# Patient Record
Sex: FEMALE | Race: ASIAN | NOT HISPANIC OR LATINO | URBAN - METROPOLITAN AREA
[De-identification: names, ages, dates, MRNs, and addresses within clinical notes are randomized per-mention and may not be internally consistent; named-entity substitution may affect disease eponyms.]

---

## 2023-08-11 ENCOUNTER — INPATIENT (INPATIENT)
Facility: HOSPITAL | Age: 68
LOS: 4 days | Discharge: HOME | End: 2023-08-16
Attending: STUDENT IN AN ORGANIZED HEALTH CARE EDUCATION/TRAINING PROGRAM | Admitting: STUDENT IN AN ORGANIZED HEALTH CARE EDUCATION/TRAINING PROGRAM
Payer: SELF-PAY

## 2023-08-11 VITALS
TEMPERATURE: 98 F | DIASTOLIC BLOOD PRESSURE: 65 MMHG | HEART RATE: 109 BPM | RESPIRATION RATE: 15 BRPM | OXYGEN SATURATION: 100 % | SYSTOLIC BLOOD PRESSURE: 115 MMHG
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
## 2023-08-11 LAB
ALBUMIN SERPL ELPH-MCNC: 3.5 G/DL — SIGNIFICANT CHANGE UP (ref 3.3–5)
ALP SERPL-CCNC: 61 U/L — SIGNIFICANT CHANGE UP (ref 40–120)
ALT FLD-CCNC: 23 U/L — SIGNIFICANT CHANGE UP (ref 4–33)
ANION GAP SERPL CALC-SCNC: 16 MMOL/L — HIGH (ref 7–14)
APPEARANCE UR: CLEAR — SIGNIFICANT CHANGE UP
APTT BLD: 31.2 SEC — SIGNIFICANT CHANGE UP (ref 24.5–35.6)
AST SERPL-CCNC: 23 U/L — SIGNIFICANT CHANGE UP (ref 4–32)
B-OH-BUTYR SERPL-SCNC: 0.4 MMOL/L — SIGNIFICANT CHANGE UP (ref 0–0.4)
BASOPHILS # BLD AUTO: 0.04 K/UL — SIGNIFICANT CHANGE UP (ref 0–0.2)
BASOPHILS NFR BLD AUTO: 0.5 % — SIGNIFICANT CHANGE UP (ref 0–2)
BILIRUB SERPL-MCNC: 0.3 MG/DL — SIGNIFICANT CHANGE UP (ref 0.2–1.2)
BILIRUB UR-MCNC: NEGATIVE — SIGNIFICANT CHANGE UP
BLD GP AB SCN SERPL QL: NEGATIVE — SIGNIFICANT CHANGE UP
BUN SERPL-MCNC: 37 MG/DL — HIGH (ref 7–23)
CALCIUM SERPL-MCNC: 9.8 MG/DL — SIGNIFICANT CHANGE UP (ref 8.4–10.5)
CHLORIDE SERPL-SCNC: 97 MMOL/L — LOW (ref 98–107)
CO2 SERPL-SCNC: 22 MMOL/L — SIGNIFICANT CHANGE UP (ref 22–31)
COLOR SPEC: YELLOW — SIGNIFICANT CHANGE UP
CREAT SERPL-MCNC: 1.26 MG/DL — SIGNIFICANT CHANGE UP (ref 0.5–1.3)
DIFF PNL FLD: NEGATIVE — SIGNIFICANT CHANGE UP
EGFR: 47 ML/MIN/1.73M2 — LOW
EOSINOPHIL # BLD AUTO: 0.21 K/UL — SIGNIFICANT CHANGE UP (ref 0–0.5)
EOSINOPHIL NFR BLD AUTO: 2.8 % — SIGNIFICANT CHANGE UP (ref 0–6)
GAS PNL BLDV: SIGNIFICANT CHANGE UP
GLUCOSE SERPL-MCNC: 183 MG/DL — HIGH (ref 70–99)
GLUCOSE UR QL: NEGATIVE MG/DL — SIGNIFICANT CHANGE UP
HCT VFR BLD CALC: 31.4 % — LOW (ref 34.5–45)
HGB BLD-MCNC: 10 G/DL — LOW (ref 11.5–15.5)
IANC: 4.33 K/UL — SIGNIFICANT CHANGE UP (ref 1.8–7.4)
IMM GRANULOCYTES NFR BLD AUTO: 0.3 % — SIGNIFICANT CHANGE UP (ref 0–0.9)
INR BLD: 1.02 RATIO — SIGNIFICANT CHANGE UP (ref 0.85–1.18)
KETONES UR-MCNC: NEGATIVE MG/DL — SIGNIFICANT CHANGE UP
LACTATE SERPL-SCNC: 4 MMOL/L — CRITICAL HIGH (ref 0.5–2)
LEUKOCYTE ESTERASE UR-ACNC: ABNORMAL
LIDOCAIN IGE QN: 88 U/L — HIGH (ref 7–60)
LYMPHOCYTES # BLD AUTO: 2.34 K/UL — SIGNIFICANT CHANGE UP (ref 1–3.3)
LYMPHOCYTES # BLD AUTO: 31.4 % — SIGNIFICANT CHANGE UP (ref 13–44)
MCHC RBC-ENTMCNC: 24 PG — LOW (ref 27–34)
MCHC RBC-ENTMCNC: 31.8 GM/DL — LOW (ref 32–36)
MCV RBC AUTO: 75.5 FL — LOW (ref 80–100)
MONOCYTES # BLD AUTO: 0.52 K/UL — SIGNIFICANT CHANGE UP (ref 0–0.9)
MONOCYTES NFR BLD AUTO: 7 % — SIGNIFICANT CHANGE UP (ref 2–14)
NEUTROPHILS # BLD AUTO: 4.33 K/UL — SIGNIFICANT CHANGE UP (ref 1.8–7.4)
NEUTROPHILS NFR BLD AUTO: 58 % — SIGNIFICANT CHANGE UP (ref 43–77)
NITRITE UR-MCNC: NEGATIVE — SIGNIFICANT CHANGE UP
NRBC # BLD: 0 /100 WBCS — SIGNIFICANT CHANGE UP (ref 0–0)
NRBC # FLD: 0 K/UL — SIGNIFICANT CHANGE UP (ref 0–0)
NT-PROBNP SERPL-SCNC: 127 PG/ML — SIGNIFICANT CHANGE UP
PH UR: 5.5 — SIGNIFICANT CHANGE UP (ref 5–8)
PLATELET # BLD AUTO: 204 K/UL — SIGNIFICANT CHANGE UP (ref 150–400)
POTASSIUM SERPL-MCNC: 5 MMOL/L — SIGNIFICANT CHANGE UP (ref 3.5–5.3)
POTASSIUM SERPL-SCNC: 5 MMOL/L — SIGNIFICANT CHANGE UP (ref 3.5–5.3)
PROT SERPL-MCNC: 6.6 G/DL — SIGNIFICANT CHANGE UP (ref 6–8.3)
PROT UR-MCNC: NEGATIVE MG/DL — SIGNIFICANT CHANGE UP
PROTHROM AB SERPL-ACNC: 11.5 SEC — SIGNIFICANT CHANGE UP (ref 9.5–13)
RBC # BLD: 4.16 M/UL — SIGNIFICANT CHANGE UP (ref 3.8–5.2)
RBC # FLD: 15 % — HIGH (ref 10.3–14.5)
RH IG SCN BLD-IMP: POSITIVE — SIGNIFICANT CHANGE UP
SODIUM SERPL-SCNC: 135 MMOL/L — SIGNIFICANT CHANGE UP (ref 135–145)
SP GR SPEC: 1.02 — SIGNIFICANT CHANGE UP (ref 1–1.03)
TROPONIN T, HIGH SENSITIVITY RESULT: 29 NG/L — SIGNIFICANT CHANGE UP
TROPONIN T, HIGH SENSITIVITY RESULT: 35 NG/L — SIGNIFICANT CHANGE UP
UROBILINOGEN FLD QL: 0.2 MG/DL — SIGNIFICANT CHANGE UP (ref 0.2–1)
WBC # BLD: 7.46 K/UL — SIGNIFICANT CHANGE UP (ref 3.8–10.5)
WBC # FLD AUTO: 7.46 K/UL — SIGNIFICANT CHANGE UP (ref 3.8–10.5)

## 2023-08-11 PROCEDURE — 73501 X-RAY EXAM HIP UNI 1 VIEW: CPT | Mod: 26,LT

## 2023-08-11 PROCEDURE — 99285 EMERGENCY DEPT VISIT HI MDM: CPT

## 2023-08-11 PROCEDURE — 99223 1ST HOSP IP/OBS HIGH 75: CPT

## 2023-08-11 PROCEDURE — 71045 X-RAY EXAM CHEST 1 VIEW: CPT | Mod: 26

## 2023-08-11 RX ORDER — HEPARIN SODIUM 5000 [USP'U]/ML
5000 INJECTION INTRAVENOUS; SUBCUTANEOUS EVERY 8 HOURS
Refills: 0 | Status: DISCONTINUED | OUTPATIENT
Start: 2023-08-11 | End: 2023-08-16

## 2023-08-11 RX ORDER — NIFEDIPINE 30 MG
1 TABLET, EXTENDED RELEASE 24 HR ORAL
Refills: 0 | DISCHARGE

## 2023-08-11 RX ORDER — ATORVASTATIN CALCIUM 80 MG/1
1 TABLET, FILM COATED ORAL
Refills: 0 | DISCHARGE

## 2023-08-11 RX ORDER — ACETAMINOPHEN 500 MG
650 TABLET ORAL EVERY 6 HOURS
Refills: 0 | Status: DISCONTINUED | OUTPATIENT
Start: 2023-08-11 | End: 2023-08-16

## 2023-08-11 RX ORDER — SODIUM CHLORIDE 9 MG/ML
1000 INJECTION INTRAMUSCULAR; INTRAVENOUS; SUBCUTANEOUS ONCE
Refills: 0 | Status: COMPLETED | OUTPATIENT
Start: 2023-08-11 | End: 2023-08-11

## 2023-08-11 RX ORDER — METFORMIN HYDROCHLORIDE 850 MG/1
1 TABLET ORAL
Refills: 0 | DISCHARGE

## 2023-08-11 RX ORDER — LANOLIN ALCOHOL/MO/W.PET/CERES
3 CREAM (GRAM) TOPICAL AT BEDTIME
Refills: 0 | Status: DISCONTINUED | OUTPATIENT
Start: 2023-08-11 | End: 2023-08-16

## 2023-08-11 RX ADMIN — SODIUM CHLORIDE 1000 MILLILITER(S): 9 INJECTION INTRAMUSCULAR; INTRAVENOUS; SUBCUTANEOUS at 14:15

## 2023-08-11 RX ADMIN — HEPARIN SODIUM 5000 UNIT(S): 5000 INJECTION INTRAVENOUS; SUBCUTANEOUS at 23:20

## 2023-08-11 NOTE — ED ADULT NURSE NOTE - NSFALLRISKINTERV_ED_ALL_ED

## 2023-08-11 NOTE — ED PROVIDER NOTE - NSICDXFAMILYHX_GEN_ALL_CORE_FT
FAMILY HISTORY:  Father  Still living? Unknown  Family history of hypertension, Age at diagnosis: Age Unknown    Grandparent  Still living? Unknown  Family history of stroke, Age at diagnosis: Age Unknown

## 2023-08-11 NOTE — ED PROVIDER NOTE - OBJECTIVE STATEMENT
Patient is a 68-year-old female with a past medical history of hypertension, diabetes on metformin who presents emergency department complaining of left-sided chest pain and left-sided pain x1 day.  Patient also states that she was diagnosed with heart failure in [facility].  Patient states that over the last few days she has increased falls due to pain.  Patient denies any fevers, chills, shortness of breath, abdominal pain, diarrhea, constipation.  Patient states the chest pain is nonradiating and intermittent in nature.

## 2023-08-11 NOTE — ED ADULT TRIAGE NOTE - CHIEF COMPLAINT QUOTE
Presents to ED c/o L sided chest pain and L flank pain x 1 day. Denies urinary symptoms. History of DM, HTN, HF.

## 2023-08-11 NOTE — ED PROVIDER NOTE - ATTENDING CONTRIBUTION TO CARE
I performed a history and physical exam of the patient and discussed their management with the resident and /or advanced care provider. I reviewed the resident and /or ACP's note and agree with the documented findings and plan of care. My medical decision making and observations are found above.  Lungs with scattered rales,  abd soft

## 2023-08-11 NOTE — ED PROVIDER NOTE - CLINICAL SUMMARY MEDICAL DECISION MAKING FREE TEXT BOX
Patient presents emergency department complaining of chest pain.  Patient is hemodynamic stable afebrile presentation.  Patient physical exam unremarkable at this time.  Patient's EKG shows no acute actionable items.  We will obtain ACS work-up and seizure work-up.  We will also obtain x-ray of the chest and pelvis to evaluate for any acute pathologies.  Pending labs and imaging for further disposition. Patient presents emergency department complaining of chest pain.  Patient is hemodynamic stable afebrile presentation.  Patient physical exam unremarkable at this time.  Patient's EKG shows no acute actionable items.  We will obtain ACS work-up and seizure work-up.  We will also obtain x-ray of the chest and pelvis to evaluate for any acute pathologies.  Pending labs and imaging for further disposition.  Alonso:68-year-old female who presents to the emergency department not able to walk across the room without shortness of breath and with 5 falls in the last few weeks.  [place].  Patient with pain in her left hip and back.  Will evaluate for ACS and CHF.  Will look for infectious sources including urine and lung.  And whether she needs to stay in the hospital will be really dependent on whether she can walk enough to take care of herself.

## 2023-08-12 DIAGNOSIS — R07.9 CHEST PAIN, UNSPECIFIED: ICD-10-CM

## 2023-08-12 DIAGNOSIS — R05.9 COUGH, UNSPECIFIED: ICD-10-CM

## 2023-08-12 DIAGNOSIS — Z98.890 OTHER SPECIFIED POSTPROCEDURAL STATES: Chronic | ICD-10-CM

## 2023-08-12 DIAGNOSIS — I10 ESSENTIAL (PRIMARY) HYPERTENSION: ICD-10-CM

## 2023-08-12 DIAGNOSIS — E11.9 TYPE 2 DIABETES MELLITUS WITHOUT COMPLICATIONS: ICD-10-CM

## 2023-08-12 DIAGNOSIS — E78.5 HYPERLIPIDEMIA, UNSPECIFIED: ICD-10-CM

## 2023-08-12 DIAGNOSIS — N17.9 ACUTE KIDNEY FAILURE, UNSPECIFIED: ICD-10-CM

## 2023-08-12 DIAGNOSIS — E87.20 ACIDOSIS, UNSPECIFIED: ICD-10-CM

## 2023-08-12 DIAGNOSIS — J45.909 UNSPECIFIED ASTHMA, UNCOMPLICATED: ICD-10-CM

## 2023-08-12 DIAGNOSIS — R09.89 OTHER SPECIFIED SYMPTOMS AND SIGNS INVOLVING THE CIRCULATORY AND RESPIRATORY SYSTEMS: ICD-10-CM

## 2023-08-12 DIAGNOSIS — D50.9 IRON DEFICIENCY ANEMIA, UNSPECIFIED: ICD-10-CM

## 2023-08-12 DIAGNOSIS — Z71.89 OTHER SPECIFIED COUNSELING: ICD-10-CM

## 2023-08-12 DIAGNOSIS — Z29.9 ENCOUNTER FOR PROPHYLACTIC MEASURES, UNSPECIFIED: ICD-10-CM

## 2023-08-12 LAB
A1C WITH ESTIMATED AVERAGE GLUCOSE RESULT: 8.4 % — HIGH (ref 4–5.6)
ALBUMIN SERPL ELPH-MCNC: 3.5 G/DL — SIGNIFICANT CHANGE UP (ref 3.3–5)
ALP SERPL-CCNC: 62 U/L — SIGNIFICANT CHANGE UP (ref 40–120)
ALT FLD-CCNC: 22 U/L — SIGNIFICANT CHANGE UP (ref 4–33)
ANION GAP SERPL CALC-SCNC: 12 MMOL/L — SIGNIFICANT CHANGE UP (ref 7–14)
APTT BLD: 29.8 SEC — SIGNIFICANT CHANGE UP (ref 24.5–35.6)
AST SERPL-CCNC: 34 U/L — HIGH (ref 4–32)
BASOPHILS # BLD AUTO: 0.03 K/UL — SIGNIFICANT CHANGE UP (ref 0–0.2)
BASOPHILS NFR BLD AUTO: 0.5 % — SIGNIFICANT CHANGE UP (ref 0–2)
BILIRUB SERPL-MCNC: 0.4 MG/DL — SIGNIFICANT CHANGE UP (ref 0.2–1.2)
BUN SERPL-MCNC: 26 MG/DL — HIGH (ref 7–23)
CALCIUM SERPL-MCNC: 9.9 MG/DL — SIGNIFICANT CHANGE UP (ref 8.4–10.5)
CHLORIDE SERPL-SCNC: 103 MMOL/L — SIGNIFICANT CHANGE UP (ref 98–107)
CHOLEST SERPL-MCNC: 128 MG/DL — SIGNIFICANT CHANGE UP
CO2 SERPL-SCNC: 24 MMOL/L — SIGNIFICANT CHANGE UP (ref 22–31)
CREAT SERPL-MCNC: 1.12 MG/DL — SIGNIFICANT CHANGE UP (ref 0.5–1.3)
EGFR: 54 ML/MIN/1.73M2 — LOW
EOSINOPHIL # BLD AUTO: 0.24 K/UL — SIGNIFICANT CHANGE UP (ref 0–0.5)
EOSINOPHIL NFR BLD AUTO: 4.2 % — SIGNIFICANT CHANGE UP (ref 0–6)
ESTIMATED AVERAGE GLUCOSE: 194 — SIGNIFICANT CHANGE UP
FERRITIN SERPL-MCNC: 72 NG/ML — SIGNIFICANT CHANGE UP (ref 13–330)
GLUCOSE SERPL-MCNC: 118 MG/DL — HIGH (ref 70–99)
HCT VFR BLD CALC: 32.5 % — LOW (ref 34.5–45)
HDLC SERPL-MCNC: 60 MG/DL — SIGNIFICANT CHANGE UP
HGB BLD-MCNC: 10.2 G/DL — LOW (ref 11.5–15.5)
IANC: 2.74 K/UL — SIGNIFICANT CHANGE UP (ref 1.8–7.4)
IMM GRANULOCYTES NFR BLD AUTO: 0.4 % — SIGNIFICANT CHANGE UP (ref 0–0.9)
INR BLD: 1.05 RATIO — SIGNIFICANT CHANGE UP (ref 0.85–1.18)
IRON SATN MFR SERPL: 26 % — SIGNIFICANT CHANGE UP (ref 14–50)
IRON SATN MFR SERPL: 62 UG/DL — SIGNIFICANT CHANGE UP (ref 30–160)
LACTATE SERPL-SCNC: 1.1 MMOL/L — SIGNIFICANT CHANGE UP (ref 0.5–2)
LIPID PNL WITH DIRECT LDL SERPL: 51 MG/DL — SIGNIFICANT CHANGE UP
LYMPHOCYTES # BLD AUTO: 2.22 K/UL — SIGNIFICANT CHANGE UP (ref 1–3.3)
LYMPHOCYTES # BLD AUTO: 39 % — SIGNIFICANT CHANGE UP (ref 13–44)
MAGNESIUM SERPL-MCNC: 1.7 MG/DL — SIGNIFICANT CHANGE UP (ref 1.6–2.6)
MCHC RBC-ENTMCNC: 23.8 PG — LOW (ref 27–34)
MCHC RBC-ENTMCNC: 31.4 GM/DL — LOW (ref 32–36)
MCV RBC AUTO: 75.9 FL — LOW (ref 80–100)
MONOCYTES # BLD AUTO: 0.44 K/UL — SIGNIFICANT CHANGE UP (ref 0–0.9)
MONOCYTES NFR BLD AUTO: 7.7 % — SIGNIFICANT CHANGE UP (ref 2–14)
NEUTROPHILS # BLD AUTO: 2.74 K/UL — SIGNIFICANT CHANGE UP (ref 1.8–7.4)
NEUTROPHILS NFR BLD AUTO: 48.2 % — SIGNIFICANT CHANGE UP (ref 43–77)
NON HDL CHOLESTEROL: 68 MG/DL — SIGNIFICANT CHANGE UP
NRBC # BLD: 0 /100 WBCS — SIGNIFICANT CHANGE UP (ref 0–0)
NRBC # FLD: 0 K/UL — SIGNIFICANT CHANGE UP (ref 0–0)
NT-PROBNP SERPL-SCNC: 133 PG/ML — SIGNIFICANT CHANGE UP
PHOSPHATE SERPL-MCNC: 3.8 MG/DL — SIGNIFICANT CHANGE UP (ref 2.5–4.5)
PLATELET # BLD AUTO: 220 K/UL — SIGNIFICANT CHANGE UP (ref 150–400)
POTASSIUM SERPL-MCNC: 4.4 MMOL/L — SIGNIFICANT CHANGE UP (ref 3.5–5.3)
POTASSIUM SERPL-SCNC: 4.4 MMOL/L — SIGNIFICANT CHANGE UP (ref 3.5–5.3)
PROCALCITONIN SERPL-MCNC: 0.1 NG/ML — SIGNIFICANT CHANGE UP (ref 0.02–0.1)
PROT SERPL-MCNC: 6.9 G/DL — SIGNIFICANT CHANGE UP (ref 6–8.3)
PROTHROM AB SERPL-ACNC: 11.7 SEC — SIGNIFICANT CHANGE UP (ref 9.5–13)
RBC # BLD: 4.28 M/UL — SIGNIFICANT CHANGE UP (ref 3.8–5.2)
RBC # FLD: 15.1 % — HIGH (ref 10.3–14.5)
SODIUM SERPL-SCNC: 139 MMOL/L — SIGNIFICANT CHANGE UP (ref 135–145)
TIBC SERPL-MCNC: 242 UG/DL — SIGNIFICANT CHANGE UP (ref 220–430)
TRANSFERRIN SERPL-MCNC: 209 MG/DL — SIGNIFICANT CHANGE UP (ref 200–360)
TRIGL SERPL-MCNC: 84 MG/DL — SIGNIFICANT CHANGE UP
UIBC SERPL-MCNC: 180 UG/DL — SIGNIFICANT CHANGE UP (ref 110–370)
WBC # BLD: 5.69 K/UL — SIGNIFICANT CHANGE UP (ref 3.8–10.5)
WBC # FLD AUTO: 5.69 K/UL — SIGNIFICANT CHANGE UP (ref 3.8–10.5)

## 2023-08-12 PROCEDURE — 93971 EXTREMITY STUDY: CPT | Mod: 26,LT

## 2023-08-12 PROCEDURE — 76770 US EXAM ABDO BACK WALL COMP: CPT | Mod: 26

## 2023-08-12 PROCEDURE — 99233 SBSQ HOSP IP/OBS HIGH 50: CPT

## 2023-08-12 RX ORDER — DEXTROSE 50 % IN WATER 50 %
15 SYRINGE (ML) INTRAVENOUS ONCE
Refills: 0 | Status: DISCONTINUED | OUTPATIENT
Start: 2023-08-12 | End: 2023-08-16

## 2023-08-12 RX ORDER — DEXTROSE 50 % IN WATER 50 %
25 SYRINGE (ML) INTRAVENOUS ONCE
Refills: 0 | Status: DISCONTINUED | OUTPATIENT
Start: 2023-08-12 | End: 2023-08-16

## 2023-08-12 RX ORDER — INSULIN LISPRO 100/ML
VIAL (ML) SUBCUTANEOUS
Refills: 0 | Status: DISCONTINUED | OUTPATIENT
Start: 2023-08-12 | End: 2023-08-16

## 2023-08-12 RX ORDER — INSULIN LISPRO 100/ML
VIAL (ML) SUBCUTANEOUS AT BEDTIME
Refills: 0 | Status: DISCONTINUED | OUTPATIENT
Start: 2023-08-12 | End: 2023-08-16

## 2023-08-12 RX ORDER — SODIUM CHLORIDE 9 MG/ML
1000 INJECTION, SOLUTION INTRAVENOUS
Refills: 0 | Status: DISCONTINUED | OUTPATIENT
Start: 2023-08-12 | End: 2023-08-16

## 2023-08-12 RX ORDER — DEXTROSE 50 % IN WATER 50 %
12.5 SYRINGE (ML) INTRAVENOUS ONCE
Refills: 0 | Status: DISCONTINUED | OUTPATIENT
Start: 2023-08-12 | End: 2023-08-16

## 2023-08-12 RX ORDER — ATORVASTATIN CALCIUM 80 MG/1
20 TABLET, FILM COATED ORAL AT BEDTIME
Refills: 0 | Status: DISCONTINUED | OUTPATIENT
Start: 2023-08-12 | End: 2023-08-16

## 2023-08-12 RX ORDER — BUDESONIDE AND FORMOTEROL FUMARATE DIHYDRATE 160; 4.5 UG/1; UG/1
2 AEROSOL RESPIRATORY (INHALATION)
Refills: 0 | Status: DISCONTINUED | OUTPATIENT
Start: 2023-08-12 | End: 2023-08-16

## 2023-08-12 RX ORDER — AZITHROMYCIN 500 MG/1
500 TABLET, FILM COATED ORAL DAILY
Refills: 0 | Status: DISCONTINUED | OUTPATIENT
Start: 2023-08-12 | End: 2023-08-13

## 2023-08-12 RX ORDER — ALBUTEROL 90 UG/1
2 AEROSOL, METERED ORAL EVERY 6 HOURS
Refills: 0 | Status: DISCONTINUED | OUTPATIENT
Start: 2023-08-12 | End: 2023-08-16

## 2023-08-12 RX ORDER — GLUCAGON INJECTION, SOLUTION 0.5 MG/.1ML
1 INJECTION, SOLUTION SUBCUTANEOUS ONCE
Refills: 0 | Status: DISCONTINUED | OUTPATIENT
Start: 2023-08-12 | End: 2023-08-16

## 2023-08-12 RX ORDER — LOSARTAN POTASSIUM 100 MG/1
50 TABLET, FILM COATED ORAL DAILY
Refills: 0 | Status: DISCONTINUED | OUTPATIENT
Start: 2023-08-12 | End: 2023-08-15

## 2023-08-12 RX ORDER — CEFTRIAXONE 500 MG/1
1000 INJECTION, POWDER, FOR SOLUTION INTRAMUSCULAR; INTRAVENOUS EVERY 24 HOURS
Refills: 0 | Status: DISCONTINUED | OUTPATIENT
Start: 2023-08-12 | End: 2023-08-13

## 2023-08-12 RX ORDER — AMLODIPINE BESYLATE 2.5 MG/1
10 TABLET ORAL DAILY
Refills: 0 | Status: DISCONTINUED | OUTPATIENT
Start: 2023-08-12 | End: 2023-08-12

## 2023-08-12 RX ORDER — CEFTRIAXONE 500 MG/1
1000 INJECTION, POWDER, FOR SOLUTION INTRAMUSCULAR; INTRAVENOUS EVERY 24 HOURS
Refills: 0 | Status: DISCONTINUED | OUTPATIENT
Start: 2023-08-12 | End: 2023-08-12

## 2023-08-12 RX ORDER — NIFEDIPINE 30 MG
30 TABLET, EXTENDED RELEASE 24 HR ORAL DAILY
Refills: 0 | Status: DISCONTINUED | OUTPATIENT
Start: 2023-08-12 | End: 2023-08-16

## 2023-08-12 RX ADMIN — HEPARIN SODIUM 5000 UNIT(S): 5000 INJECTION INTRAVENOUS; SUBCUTANEOUS at 14:45

## 2023-08-12 RX ADMIN — HEPARIN SODIUM 5000 UNIT(S): 5000 INJECTION INTRAVENOUS; SUBCUTANEOUS at 06:01

## 2023-08-12 RX ADMIN — LOSARTAN POTASSIUM 50 MILLIGRAM(S): 100 TABLET, FILM COATED ORAL at 06:17

## 2023-08-12 RX ADMIN — Medication 30 MILLIGRAM(S): at 06:59

## 2023-08-12 RX ADMIN — HEPARIN SODIUM 5000 UNIT(S): 5000 INJECTION INTRAVENOUS; SUBCUTANEOUS at 22:22

## 2023-08-12 RX ADMIN — AZITHROMYCIN 500 MILLIGRAM(S): 500 TABLET, FILM COATED ORAL at 11:56

## 2023-08-12 RX ADMIN — CEFTRIAXONE 100 MILLIGRAM(S): 500 INJECTION, POWDER, FOR SOLUTION INTRAMUSCULAR; INTRAVENOUS at 07:36

## 2023-08-12 RX ADMIN — ATORVASTATIN CALCIUM 20 MILLIGRAM(S): 80 TABLET, FILM COATED ORAL at 22:22

## 2023-08-12 RX ADMIN — Medication 3 MILLIGRAM(S): at 01:02

## 2023-08-12 NOTE — H&P ADULT - PROBLEM SELECTOR PLAN 4
-reportedly with productive cough over last week, was on -reportedly with productive cough over last week, was on ceftin at home   -will need to get accurate accounts of how many days of antibiotics she has taken   -will do ceftriaxone and azithromycin for now, will recommend completion of 7 days.   -CXR clear  -continue to monitor -reportedly with productive cough over last week, was on ceftin at home   -will need to get accurate accounts of how many days of antibiotics she has taken   -will do ceftriaxone and azithromycin for now, will recommend completion of 7 days.   -CXR clear, will order CT chest since ordered CT abdomen and pelvis, to r/o PNA  -continue to monitor

## 2023-08-12 NOTE — H&P ADULT - HISTORY OF PRESENT ILLNESS
Mr. Christianson is a 68 year old F with history of HTN, DM2 (on oral glycemic, previously on insulin), CVA (left sided 3 years ago, with some residual left sided weakness), HF? (recently diagnosed in [facility]), asthma who presents with left sided waist pain (described as a stretching/burning sensation) that radiates to the back that started 2 weeks. She reports the pain is worsened with exertion and worsens when bending or with movement. She reports over the last month she has had worsening SOB on exertion, that improves after 2 minutes at rest, that has become more frequent and occurs anytime she walks/exerts herself and is associated with syncope. She also reports productive cough- yellow sputum over that same time frame. She reports associated LE edema over the last month that is associated with PND. She reports weight gain 60-80 pounds in 2 years, more of which occurred over the last year. She reports two weeks ago she was seen in [facility] at that time, she was treated with a course of antibiotics (Augmentin at first followed by ceftin, given she had some oral ulcers with augmentin). She denies numbness/tingling in her arms that is new, jaw/neck pain, sweats, nausea, vomiting, or palpitations. She reports the pain has limited her ability to ambulate significantly. She reports chronic epigastric/abdominal pain, constipation, and chronic reflux (where she feels the acid in the back of her throat when she sleeps). Labs were sig for the following: Hb 10.0/31.4, MCV 75.4, RDW 15, LA 4.0, Cl 97, AG 16, BUN/Cr 37/1.26, , lipase 88, trop 35->29. EKG was interpreted by me: sinus rhythm,  bpm, p wave inversion in V1, Q waves in V1, V2, V3, QTc 430 ms. She tolerated ceftin at home. Urinalysis was significant for the following: UA-LE, moderate and 34 WBC. Denies any dysuria, urinary frequency, she has urinary incontinence periodically.  Mr. Christianson is a 68 year old F with history of HTN, DM2 (on oral glycemic, previously on insulin), CVA (left sided 3 years ago, with some residual left sided weakness), HF? (recently diagnosed in [facility]), asthma who presents with left sided waist pain (described as a stretching/burning sensation) that radiates to the back that started 2 weeks. She reports the pain is worsened with exertion and worsens when bending or with movement. She reports over the last month she has had worsening SOB on exertion, that improves after 2 minutes at rest, that has become more frequent and occurs anytime she walks/exerts herself and is associated with syncope. She also reports productive cough- yellow sputum over that same time frame. She reports associated LE edema over the last month that is associated with PND. She reports weight gain 60-80 pounds in 2 years, more of which occurred over the last year. She reports two weeks ago she was seen in [facility] at that time, she was treated with a course of antibiotics (Augmentin at first followed by ceftin, given she had some oral ulcers with Augmentin She denies numbness/tingling in her arms that is new, jaw/neck pain, sweats, nausea, vomiting, or palpitations. She reports the pain has limited her ability to ambulate significantly. She reports chronic epigastric/abdominal pain, constipation, and chronic reflux (where she feels the acid in the back of her throat when she sleeps). Labs were sig for the following: Hb 10.0/31.4, MCV 75.4, RDW 15, LA 4.0, Cl 97, AG 16, BUN/Cr 37/1.26, , lipase 88, trop 35->29. EKG was interpreted by me: sinus rhythm,  bpm, p wave inversion in V1, Q waves in V1, V2, V3, QTc 430 ms. She tolerated ceftin at home. Urinalysis was significant for the following: UA-LE, moderate and 34 WBC. Denies any dysuria, urinary frequency, she has urinary incontinence periodically.

## 2023-08-12 NOTE — H&P ADULT - NSICDXFAMILYHX_GEN_ALL_CORE_FT
FAMILY HISTORY:  Father  Still living? Unknown  Family history of hypertension, Age at diagnosis: Age Unknown  FH: diabetes mellitus, Age at diagnosis: Age Unknown    Grandparent  Still living? Unknown  Family history of stroke, Age at diagnosis: Age Unknown

## 2023-08-12 NOTE — PATIENT PROFILE ADULT - FALL HARM RISK - HARM RISK INTERVENTIONS
Assistance with ambulation/Assistance OOB with selected safe patient handling equipment/Communicate Risk of Fall with Harm to all staff/Reinforce activity limits and safety measures with patient and family/Sit up slowly, dangle for a short time, stand at bedside before walking/Tailored Fall Risk Interventions/Visual Cue: Yellow wristband and red socks/Bed in lowest position, wheels locked, appropriate side rails in place/Call bell, personal items and telephone in reach/Instruct patient to call for assistance before getting out of bed or chair/Non-slip footwear when patient is out of bed/[place] to call system/Physically safe environment - no spills, clutter or unnecessary equipment/Purposeful Proactive Rounding/Room/bathroom lighting operational, light cord in reach

## 2023-08-12 NOTE — H&P ADULT - PROBLEM SELECTOR PLAN 2
-BUN/Cr 37/1.26, unknown baseline, suspect hypovolemia is part of it given patient reports she does not drink any water   -obtain baseline Cr when able   -Renal bladder US ordered to rule out nephrolithiasis vs hydronephrosis   -Urine, Na ordered   -strict intake and output every 4 hours  -continue to monitor

## 2023-08-12 NOTE — H&P ADULT - PROBLEM SELECTOR PLAN 5
-On metformin 500 mg BID currently, previously on insulin   -A1C in AM  -correctional insulin for now  -BG ACHS

## 2023-08-12 NOTE — PROGRESS NOTE ADULT - PROBLEM SELECTOR PLAN 7
- at home on amlodipine 10 mg daily, nifedipine 15 mg BID and losartan 50 mg daily -> will confirm with med-pharmacy, will do nifedipine 30 mg daily and losartan 50 mg daily for now  -will send email to pharmacy to [facility] to confirm medications   -continue to monitor

## 2023-08-12 NOTE — H&P ADULT - PROBLEM SELECTOR PLAN 1
-intermittent chest pain on exertion with associated shortness of breath AND syncope concerning for angina   -trop 35->29,   -EKG with Q waves in V1-V3 concerning   -TTE ordered   -telemetry  -cardiology consult to be called in the AM

## 2023-08-12 NOTE — H&P ADULT - PROBLEM SELECTOR PLAN 10
DVT prophylaxis: heparin 5000 every 8 hours  GI prophylaxis: none- but sounds like she needs workup for GERD as an outpatient, since she has bad reflux for many years, needs endoscopy and colonoscopy (since never had colonoscopy).   Full code  Diet: diabetes diet

## 2023-08-12 NOTE — H&P ADULT - PROBLEM SELECTOR PLAN 7
- at home on amlodipine 10 mg daily, nifedipine 15 mg BID and losartan 50 mg daily   -will send email to pharmacy to [facility] to confirm medications   -continue to monitor - at home on amlodipine 10 mg daily, nifedipine 15 mg BID and losartan 50 mg daily -> will confirm with med-pharmacy, will do nifedipine 30 mg daily and losartan 50 mg daily for now  -will send email to pharmacy to [facility] to confirm medications   -continue to monitor

## 2023-08-12 NOTE — H&P ADULT - NSHPPHYSICALEXAM_GEN_ALL_CORE
Vital Signs Last 24 Hrs  T(C): 36.9 (11 Aug 2023 22:28), Max: 36.9 (11 Aug 2023 22:28)  T(F): 98.5 (11 Aug 2023 22:28), Max: 98.5 (11 Aug 2023 22:28)  HR: 92 (11 Aug 2023 22:28) (85 - 112)  BP: 141/83 (11 Aug 2023 22:28) (115/65 - 143/85)  BP(mean): --  RR: 18 (11 Aug 2023 22:28) (15 - 18)  SpO2: 100% (11 Aug 2023 22:28) (100% - 100%)    Parameters below as of 11 Aug 2023 22:28  Patient On (Oxygen Delivery Method): room air        CONSTITUTIONAL: Well-groomed, in no apparent distress  EYES: No conjunctival or scleral injection, non-icteric; PERRLA and symmetric  ENMT: No external nasal lesions; nasal mucosa not inflamed; normal dentition  NECK: Trachea midline without palpable neck mass; thyroid not enlarged and non-tender  RESPIRATORY: Breathing comfortably; no dullness to percussion; lungs CTA without wheeze/rhonchi/rales  CARDIOVASCULAR: +S1S2, RRR, no M/G/R; pedal pulses full and symmetric; no 1+ lower extremity edema  GASTROINTESTINAL: No palpable masses or tenderness, +BS throughout, no rebound/guarding; no hepatosplenomegaly; no hernia palpated  MUSCULOSKELETAL:  no digital clubbing or cyanosis; strength (4/5 in left upper extremity, 5/5 in right upper extremity, 5/5 in the lower extremities bilaterally) and tone of extremities  SKIN: No rashes or ulcers noted; no subcutaneous nodules or induration palpable  NEUROLOGIC: CN II-XII intact; sensation intact in LEs b/l to light touch  PSYCHIATRIC: A+O x 2 (not oriented to time- didn't know year or month, new it was friday and new all the rest; mood and affect appropriate; appropriate insight and judgment Vital Signs Last 24 Hrs  T(C): 36.9 (11 Aug 2023 22:28), Max: 36.9 (11 Aug 2023 22:28)  T(F): 98.5 (11 Aug 2023 22:28), Max: 98.5 (11 Aug 2023 22:28)  HR: 92 (11 Aug 2023 22:28) (85 - 112)  BP: 141/83 (11 Aug 2023 22:28) (115/65 - 143/85)  BP(mean): --  RR: 18 (11 Aug 2023 22:28) (15 - 18)  SpO2: 100% (11 Aug 2023 22:28) (100% - 100%)    Parameters below as of 11 Aug 2023 22:28  Patient On (Oxygen Delivery Method): room air        CONSTITUTIONAL: Well-groomed, in no apparent distress  EYES: No conjunctival or scleral injection, non-icteric; PERRLA and symmetric  ENMT: No external nasal lesions; nasal mucosa not inflamed; normal dentition  NECK: Trachea midline without palpable neck mass; thyroid not enlarged and non-tender  RESPIRATORY: Breathing comfortably; no dullness to percussion; lungs CTA with wheeze, no rhonchi or rales  CARDIOVASCULAR: +S1S2, RRR, no M/G/R; pedal pulses full and symmetric; no 1+ lower extremity edema  GASTROINTESTINAL: No palpable masses or tenderness, +BS throughout, no rebound/guarding; no hepatosplenomegaly; no hernia palpated  MUSCULOSKELETAL:  no digital clubbing or cyanosis; strength (4/5 in left upper extremity, 5/5 in right upper extremity, 5/5 in the lower extremities bilaterally) and tone of extremities  SKIN: No rashes or ulcers noted; no subcutaneous nodules or induration palpable  NEUROLOGIC: CN II-XII intact; sensation intact in LEs b/l to light touch  PSYCHIATRIC: A+O x 2 (not oriented to time- didn't know year or month, new it was friday and new all the rest; mood and affect appropriate; appropriate insight and judgment

## 2023-08-12 NOTE — H&P ADULT - PROBLEM SELECTOR PLAN 6
-Lactic acid 4 with chest pain either cardiac vs abdominal source, with no troponin elevation, consider CT abdomen and pelvis to r/o ischemia or abdominal pathology considering LA elevation   -continue to trend with AM labs

## 2023-08-12 NOTE — H&P ADULT - NSHPREVIEWOFSYSTEMS_GEN_ALL_CORE
Review of Systems:   CONSTITUTIONAL: No fever, weight loss  EYES: No eye pain, visual disturbances, or discharge  ENMT:  No difficulty hearing, tinnitus, vertigo; No sinus or throat pain  RESPIRATORY: +SOB. +cough, +wheezing, chills or hemoptysis  CARDIOVASCULAR: +chest pain, no palpitations, +dizziness, or +leg swelling (left greater than right -for a several months-3 months)  GASTROINTESTINAL: No abdominal or epigastric pain. No nausea, vomiting, or hematemesis; No diarrhea or constipation. No melena or hematochezia.  GENITOURINARY: No dysuria, frequency, hematuria. +incontinence  NEUROLOGICAL: headaches, memory loss, loss of strength, numbness, or tremors  SKIN: No itching, burning, rashes, or lesions   ENDOCRINE: No heat or cold intolerance; No hair loss  MUSCULOSKELETAL: No joint pain or swelling; No muscle, +back pain  PSYCHIATRIC: No depression, anxiety, mood swings, or difficulty sleeping  HEME/LYMPH: No easy bruising, or bleeding gums

## 2023-08-12 NOTE — H&P ADULT - ASSESSMENT
Mr. Christianson is a 68 year old F with history of HTN, DM2 (on oral glycemic, previously on insulin), CVA (left sided 3 years ago, with some residual left sided weakness), HF? (recently diagnosed in [facility]), and asthma who presents with left sided waist pain that radiates to the back with exertion for the last 2 weeks.

## 2023-08-12 NOTE — H&P ADULT - NSICDXPASTMEDICALHX_GEN_ALL_CORE_FT
PAST MEDICAL HISTORY:  Asthma     CVA (cerebrovascular accident)     Diabetes     HTN (hypertension)

## 2023-08-12 NOTE — H&P ADULT - NSHPLABSRESULTS_GEN_ALL_CORE
10.0   7.46  )-----------( 204      ( 11 Aug 2023 11:59 )             31.4     135  |  97<L>  |  37<H>  ----------------------------<  183<H>       5.0   |  22  |  1.26    Ca    9.8      11 Aug 2023 11:59    TPro  6.6  /  Alb  3.5  /  TBili  0.3  /  DBili  x   /  AST  23  /  ALT  23  /  AlkPhos  61      PT/INR: 11.5/1.02 (23 @ 11:59)  PTT: 31.2 (23 @ 11:59)      11:59 - VBG - pH: 7.34  | pCO2: 44    | pO2: 37    | Lactate: 4.4            hs Troponin, T - 29 ng/L (23 @ 14:25)  hs Troponin, T - 35 ng/L (23 @ 11:59)      Pro-BNP: 127 (23 @ 11:59)          Urinalysis Basic - ( 11 Aug 2023 11:47 )  Color: Yellow / Appearance: Clear / S.016 / pH: 5.5  Gluc: Negative mg/dL / Ketone: Negative mg/dL  / Bili: Negative / Urobili: 0.2 mg/dL   Blood: Negative / Protein: Negative mg/dL / Nitrite: Negative   Leuk Esterase: Moderate / RBC: 2 /HPF / WBC 34 /HPF   Sq Epi: x / Non Sq Epi: x / Bacteria: Negative /HPF

## 2023-08-13 DIAGNOSIS — M89.9 DISORDER OF BONE, UNSPECIFIED: ICD-10-CM

## 2023-08-13 LAB
A1C WITH ESTIMATED AVERAGE GLUCOSE RESULT: 8.5 % — HIGH (ref 4–5.6)
CULTURE RESULTS: SIGNIFICANT CHANGE UP
ESTIMATED AVERAGE GLUCOSE: 197 — SIGNIFICANT CHANGE UP
HCV AB S/CO SERPL IA: 0.23 S/CO — SIGNIFICANT CHANGE UP (ref 0–0.99)
HCV AB SERPL-IMP: SIGNIFICANT CHANGE UP
SPECIMEN SOURCE: SIGNIFICANT CHANGE UP

## 2023-08-13 PROCEDURE — 71250 CT THORAX DX C-: CPT | Mod: 26

## 2023-08-13 PROCEDURE — 99233 SBSQ HOSP IP/OBS HIGH 50: CPT

## 2023-08-13 PROCEDURE — 74176 CT ABD & PELVIS W/O CONTRAST: CPT | Mod: 26

## 2023-08-13 RX ORDER — PANTOPRAZOLE SODIUM 20 MG/1
40 TABLET, DELAYED RELEASE ORAL
Refills: 0 | Status: DISCONTINUED | OUTPATIENT
Start: 2023-08-13 | End: 2023-08-16

## 2023-08-13 RX ORDER — LIDOCAINE 4 G/100G
1 CREAM TOPICAL DAILY
Refills: 0 | Status: DISCONTINUED | OUTPATIENT
Start: 2023-08-13 | End: 2023-08-16

## 2023-08-13 RX ORDER — INSULIN GLARGINE 100 [IU]/ML
3 INJECTION, SOLUTION SUBCUTANEOUS AT BEDTIME
Refills: 0 | Status: DISCONTINUED | OUTPATIENT
Start: 2023-08-13 | End: 2023-08-16

## 2023-08-13 RX ADMIN — HEPARIN SODIUM 5000 UNIT(S): 5000 INJECTION INTRAVENOUS; SUBCUTANEOUS at 14:55

## 2023-08-13 RX ADMIN — HEPARIN SODIUM 5000 UNIT(S): 5000 INJECTION INTRAVENOUS; SUBCUTANEOUS at 22:23

## 2023-08-13 RX ADMIN — LOSARTAN POTASSIUM 50 MILLIGRAM(S): 100 TABLET, FILM COATED ORAL at 06:37

## 2023-08-13 RX ADMIN — INSULIN GLARGINE 3 UNIT(S): 100 INJECTION, SOLUTION SUBCUTANEOUS at 22:24

## 2023-08-13 RX ADMIN — Medication 1: at 17:47

## 2023-08-13 RX ADMIN — Medication 75 MILLIGRAM(S): at 17:47

## 2023-08-13 RX ADMIN — HEPARIN SODIUM 5000 UNIT(S): 5000 INJECTION INTRAVENOUS; SUBCUTANEOUS at 06:38

## 2023-08-13 RX ADMIN — Medication 30 MILLIGRAM(S): at 06:37

## 2023-08-13 RX ADMIN — Medication 3: at 09:47

## 2023-08-13 RX ADMIN — CEFTRIAXONE 100 MILLIGRAM(S): 500 INJECTION, POWDER, FOR SOLUTION INTRAMUSCULAR; INTRAVENOUS at 06:53

## 2023-08-13 RX ADMIN — AZITHROMYCIN 500 MILLIGRAM(S): 500 TABLET, FILM COATED ORAL at 13:19

## 2023-08-13 RX ADMIN — Medication 650 MILLIGRAM(S): at 06:46

## 2023-08-13 RX ADMIN — Medication 650 MILLIGRAM(S): at 07:16

## 2023-08-13 RX ADMIN — Medication 2: at 13:19

## 2023-08-13 RX ADMIN — ATORVASTATIN CALCIUM 20 MILLIGRAM(S): 80 TABLET, FILM COATED ORAL at 22:23

## 2023-08-13 RX ADMIN — Medication 25 MILLIGRAM(S): at 10:15

## 2023-08-13 RX ADMIN — BUDESONIDE AND FORMOTEROL FUMARATE DIHYDRATE 2 PUFF(S): 160; 4.5 AEROSOL RESPIRATORY (INHALATION) at 10:39

## 2023-08-13 NOTE — CONSULT NOTE ADULT - SUBJECTIVE AND OBJECTIVE BOX
Víctor Fuentes MD  Interventional Cardiology / Advance Heart Failure and Cardiac Transplant Specialist  [place] Office : [address] [place] [address] [place] [address]  Tel:   [place] Office : [address] [facility] [place] [address]  Tel: [phone]         HISTORY OF PRESENTING ILLNESS:  HPI:  Mr. Christianson is a 68 year old F with history of HTN, DM2 (on oral glycemic, previously on insulin), CVA (left sided 3 years ago, with some residual left sided weakness), HF? (recently diagnosed in [facility]), asthma who presents with left sided waist pain (described as a stretching/burning sensation) that radiates to the back that started 2 weeks. She reports the pain is worsened with exertion and worsens when bending or with movement. She reports over the last month she has had worsening SOB on exertion, that improves after 2 minutes at rest, that has become more frequent and occurs anytime she walks/exerts herself and is associated with syncope. She also reports productive cough- yellow sputum over that same time frame. She reports associated LE edema over the last month that is associated with PND. She reports weight gain 60-80 pounds in 2 years, more of which occurred over the last year. She reports two weeks ago she was seen in [facility] at that time, she was treated with a course of antibiotics (Augmentin at first followed by ceftin, given she had some oral ulcers with Augmentin She denies numbness/tingling in her arms that is new, jaw/neck pain, sweats, nausea, vomiting, or palpitations. She reports the pain has limited her ability to ambulate significantly. She reports chronic epigastric/abdominal pain, constipation, and chronic reflux (where she feels the acid in the back of her throat when she sleeps). Labs were sig for the following: Hb 10.0/31.4, MCV 75.4, RDW 15, LA 4.0, Cl 97, AG 16, BUN/Cr 37/1.26, , lipase 88, trop 35->29. EKG was interpreted by me: sinus rhythm,  bpm, p wave inversion in V1, Q waves in V1, V2, V3, QTc 430 ms. She tolerated ceftin at home. Urinalysis was significant for the following: UA-LE, moderate and 34 WBC. Denies any dysuria, urinary frequency, she has urinary incontinence periodically.    PAST MEDICAL & SURGICAL HISTORY:  HTN (hypertension)      Diabetes      Asthma      CVA (cerebrovascular accident)      Status post hip surgery          SOCIAL HISTORY: Substance Use (street drugs): ( x ) never used  (  ) other:    FAMILY HISTORY:  Family history of stroke (Grandparent)    Family history of hypertension (Father)    FH: diabetes mellitus (Father)         MEDICATIONS:  heparin   Injectable 5000 Unit(s) SubCutaneous every 8 hours  losartan 50 milliGRAM(s) Oral daily  NIFEdipine XL 30 milliGRAM(s) Oral daily    azithromycin   Tablet 500 milliGRAM(s) Oral daily  cefTRIAXone   IVPB 1000 milliGRAM(s) IV Intermittent every 24 hours    albuterol    90 MICROgram(s) HFA Inhaler 2 Puff(s) Inhalation every 6 hours PRN  budesonide  80 MICROgram(s)/formoterol 4.5 MICROgram(s) Inhaler 2 Puff(s) Inhalation two times a day    acetaminophen     Tablet .. 650 milliGRAM(s) Oral every 6 hours PRN  melatonin 3 milliGRAM(s) Oral at bedtime PRN  pregabalin 25 milliGRAM(s) Oral two times a day      atorvastatin 20 milliGRAM(s) Oral at bedtime  dextrose 50% Injectable 25 Gram(s) IV Push once  dextrose 50% Injectable 25 Gram(s) IV Push once  dextrose 50% Injectable 12.5 Gram(s) IV Push once  dextrose Oral Gel 15 Gram(s) Oral once PRN  glucagon  Injectable 1 milliGRAM(s) IntraMuscular once  insulin lispro (ADMELOG) corrective regimen sliding scale   SubCutaneous at bedtime  insulin lispro (ADMELOG) corrective regimen sliding scale   SubCutaneous three times a day before meals    dextrose 5%. 1000 milliLiter(s) IV Continuous <Continuous>  dextrose 5%. 1000 milliLiter(s) IV Continuous <Continuous>      FAMILY HISTORY:  Family history of stroke (Grandparent)    Family history of hypertension (Father)    FH: diabetes mellitus (Father)          Allergies    Augmentin (Other)    Intolerances    	      PHYSICAL EXAM:  T(C): 36.5 (08-13-23 @ 06:37), Max: 36.8 (08-12-23 @ 21:13)  HR: 91 (08-13-23 @ 06:37) (67 - 112)  BP: 115/61 (08-13-23 @ 06:37) (109/59 - 143/81)  RR: 18 (08-13-23 @ 06:37) (16 - 18)  SpO2: 100% (08-13-23 @ 06:37) (100% - 100%)  Wt(kg): --  I&O's Summary    13 Aug 2023 07:01  -  13 Aug 2023 14:41  --------------------------------------------------------  IN: 600 mL / OUT: 3 mL / NET: 597 mL        GENERAL: NAD   EYES: EOMI, PERRLA, conjunctiva and sclera clear  ENMT: No tonsillar erythema, exudates, or enlargement; Moist mucous membranes, Good dentition, No lesions  Cardiovascular: Normal S1 S2, No JVD, No murmurs, No edema  Respiratory: Lungs clear to auscultation	  Gastrointestinal:  Soft, Non-tender, + BS	  Extremities: no edema       LABS:	 	    CARDIAC MARKERS:                                  10.2   5.69  )-----------( 220      ( 12 Aug 2023 05:35 )             32.5     08-12    139  |  103  |  26<H>  ----------------------------<  118<H>  4.4   |  24  |  1.12    Ca    9.9      12 Aug 2023 05:35  Phos  3.8     08-12  Mg     1.70     08-12    TPro  6.9  /  Alb  3.5  /  TBili  0.4  /  DBili  x   /  AST  34<H>  /  ALT  22  /  AlkPhos  62  08-12    proBNP:   Lipid Profile:   HgA1c:   TSH:     Consultant(s) Notes Reviewed:  [x ] YES  [ ] NO    Care Discussed with Consultants/Other Providers [ x] YES  [ ] NO    Imaging Personally Reviewed independently:  [x] YES  [ ] NO    All labs, radiologic studies, vitals, orders and medications list reviewed. Patient is seen and examined at bedside. Case discussed with medical team.    ASSESSMENT/PLAN:

## 2023-08-13 NOTE — PHYSICAL THERAPY INITIAL EVALUATION ADULT - MANUAL MUSCLE TESTING RESULTS, REHAB EVAL
Bilateral UE muscle strength grossly at least 3/5 Throughout; Bilateral LE muscle strength grossly at least 3/5 throughout./grossly assessed due to

## 2023-08-13 NOTE — PHYSICAL THERAPY INITIAL EVALUATION ADULT - ADDITIONAL COMMENTS
Pt lives with her son in an apartment with 3 stairs to enter. prior to admission Pt was independent with all mobility and ambulated without an assistive device.     Pt. left comfortable in bedside chair with all tubes/lines intact, call bell in reach and in NAD. RN aware of Pt current position.

## 2023-08-13 NOTE — PHYSICAL THERAPY INITIAL EVALUATION ADULT - PERTINENT HX OF CURRENT PROBLEM, REHAB EVAL
Pt is a 68 year old female who presented to the hospital with left sided waist and chest pain that radiates to the back with exertion.

## 2023-08-13 NOTE — PROGRESS NOTE ADULT - PROBLEM SELECTOR PLAN 6
-On metformin 500 mg BID currently, previously on insulin   -A1C 8.5  -correctional insulin for now  -BG ACHS -On metformin 500 mg BID currently, previously on insulin   -A1C 8.5  -correctional insulin   -start lantus 3u  -BG ACHS

## 2023-08-13 NOTE — PROGRESS NOTE ADULT - PROBLEM SELECTOR PLAN 7
-Lactic acid 4 with chest pain either cardiac vs abdominal source, with no troponin elevation, consider CT abdomen and pelvis to r/o ischemia or abdominal pathology considering LA elevation   -continue to trend with AM labs -normalized

## 2023-08-13 NOTE — PROGRESS NOTE ADULT - TIME BILLING
Face to face encounter. Reviewed labs, vitals, imaging. Discussed plan of care with patient. Documentation in sunrise.
Face to face encounter. Reviewed labs, vitals, imaging. Ordering imaging. Reviewed consultant recs. Discussed plan of care with patient and . Documentation in sunrise.

## 2023-08-14 LAB
CULTURE RESULTS: SIGNIFICANT CHANGE UP
GLUCOSE BLDC GLUCOMTR-MCNC: 224 MG/DL — HIGH (ref 70–99)
GLUCOSE BLDC GLUCOMTR-MCNC: 230 MG/DL — HIGH (ref 70–99)
GLUCOSE BLDC GLUCOMTR-MCNC: 256 MG/DL — HIGH (ref 70–99)
SPECIMEN SOURCE: SIGNIFICANT CHANGE UP

## 2023-08-14 PROCEDURE — 93306 TTE W/DOPPLER COMPLETE: CPT | Mod: 26

## 2023-08-14 PROCEDURE — 72158 MRI LUMBAR SPINE W/O & W/DYE: CPT | Mod: 26

## 2023-08-14 PROCEDURE — 70450 CT HEAD/BRAIN W/O DYE: CPT | Mod: 26

## 2023-08-14 PROCEDURE — 99233 SBSQ HOSP IP/OBS HIGH 50: CPT

## 2023-08-14 RX ORDER — OXYCODONE HYDROCHLORIDE 5 MG/1
5 TABLET ORAL ONCE
Refills: 0 | Status: DISCONTINUED | OUTPATIENT
Start: 2023-08-14 | End: 2023-08-14

## 2023-08-14 RX ADMIN — OXYCODONE HYDROCHLORIDE 5 MILLIGRAM(S): 5 TABLET ORAL at 22:14

## 2023-08-14 RX ADMIN — LOSARTAN POTASSIUM 50 MILLIGRAM(S): 100 TABLET, FILM COATED ORAL at 05:55

## 2023-08-14 RX ADMIN — PANTOPRAZOLE SODIUM 40 MILLIGRAM(S): 20 TABLET, DELAYED RELEASE ORAL at 06:08

## 2023-08-14 RX ADMIN — Medication 1: at 22:12

## 2023-08-14 RX ADMIN — HEPARIN SODIUM 5000 UNIT(S): 5000 INJECTION INTRAVENOUS; SUBCUTANEOUS at 05:55

## 2023-08-14 RX ADMIN — LIDOCAINE 1 PATCH: 4 CREAM TOPICAL at 23:49

## 2023-08-14 RX ADMIN — LIDOCAINE 1 PATCH: 4 CREAM TOPICAL at 11:49

## 2023-08-14 RX ADMIN — Medication 1: at 09:33

## 2023-08-14 RX ADMIN — Medication 75 MILLIGRAM(S): at 05:55

## 2023-08-14 RX ADMIN — INSULIN GLARGINE 3 UNIT(S): 100 INJECTION, SOLUTION SUBCUTANEOUS at 22:07

## 2023-08-14 RX ADMIN — Medication 2: at 17:30

## 2023-08-14 RX ADMIN — Medication 75 MILLIGRAM(S): at 17:30

## 2023-08-14 RX ADMIN — BUDESONIDE AND FORMOTEROL FUMARATE DIHYDRATE 2 PUFF(S): 160; 4.5 AEROSOL RESPIRATORY (INHALATION) at 11:47

## 2023-08-14 RX ADMIN — Medication 2: at 12:55

## 2023-08-14 RX ADMIN — BUDESONIDE AND FORMOTEROL FUMARATE DIHYDRATE 2 PUFF(S): 160; 4.5 AEROSOL RESPIRATORY (INHALATION) at 22:12

## 2023-08-14 RX ADMIN — ATORVASTATIN CALCIUM 20 MILLIGRAM(S): 80 TABLET, FILM COATED ORAL at 22:01

## 2023-08-14 RX ADMIN — OXYCODONE HYDROCHLORIDE 5 MILLIGRAM(S): 5 TABLET ORAL at 22:44

## 2023-08-14 RX ADMIN — Medication 30 MILLIGRAM(S): at 05:56

## 2023-08-14 RX ADMIN — HEPARIN SODIUM 5000 UNIT(S): 5000 INJECTION INTRAVENOUS; SUBCUTANEOUS at 22:01

## 2023-08-14 NOTE — CONSULT NOTE ADULT - SUBJECTIVE AND OBJECTIVE BOX
MRN-[id number]  Patient is a 68y old  Female who presents with a chief complaint of chest pain, r/o angina (14 Aug 2023 14:25)    HPI:  Patient is a 68 year old  Rt handed F with pmh  of HTN, DM2 (on oral glycemic, previously on insulin), CVA (left sided 3 years ago, with some residual left sided weakness), HF? (recently diagnosed in [facility]), asthma presented to [facility] for worsening  with left sided waist pain (described as a stretching/burning sensation) that radiates to the back that started 2 weeks. She reports the pain is worsened with exertion and worsens when bending or with movement. She reports over the last month she has had worsening SOB on exertion, that improves after 2 minutes at rest, that has become more frequent and occurs anytime she walks/exerts herself and is associated with syncope. She reports associated LE edema over the last month that is associated with PND. She reports weight gain 60-80 pounds in 2 years, more of which occurred over the last year.  Neurology consulted for LLE weakness and frequent falls. Patient mentions she has LBP which is located on the left side with shooting pain down her left leg. Patient states when this occurs she looses control on her left leg. Due to this, she has been having falls. Patient does not have headache strike nor LOC during these events.     PAST MEDICAL & SURGICAL HISTORY:  HTN (hypertension)      Diabetes      Asthma      CVA (cerebrovascular accident)      Status post hip surgery        FAMILY HISTORY:  Family history of stroke (Grandparent)    Family history of hypertension (Father)    FH: diabetes mellitus (Father)      Social Hx:  Nonsmoker, no drug or alcohol use    Home Medications:  albuterol CFC free 90 mcg/inh inhalation aerosol: 1 puff(s) inhaled every 4 hours, As Needed for shortness of breath or wheezing (11 Aug 2023 23:08)  amLODIPine 10 mg oral tablet: 1 tab(s) orally once a day (11 Aug 2023 23:05)  Lipitor 20 mg oral tablet: 1 tab(s) orally once a day (11 Aug 2023 23:07)  losartan 50 mg oral tablet: 1 tab(s) orally once a day (11 Aug 2023 23:08)  metFORMIN 500 mg oral tablet: 1 tab(s) orally 2 times a day (11 Aug 2023 23:05)  NIFEdipine (Eqv-Adalat CC) 30 mg oral tablet, extended release: 1 tab(s) orally once a day (11 Aug 2023 23:07)    MEDICATIONS  (STANDING):  atorvastatin 20 milliGRAM(s) Oral at bedtime  budesonide  80 MICROgram(s)/formoterol 4.5 MICROgram(s) Inhaler 2 Puff(s) Inhalation two times a day  dextrose 5%. 1000 milliLiter(s) (100 mL/Hr) IV Continuous <Continuous>  dextrose 5%. 1000 milliLiter(s) (50 mL/Hr) IV Continuous <Continuous>  dextrose 50% Injectable 25 Gram(s) IV Push once  dextrose 50% Injectable 12.5 Gram(s) IV Push once  dextrose 50% Injectable 25 Gram(s) IV Push once  glucagon  Injectable 1 milliGRAM(s) IntraMuscular once  heparin   Injectable 5000 Unit(s) SubCutaneous every 8 hours  insulin glargine Injectable (LANTUS) 3 Unit(s) SubCutaneous at bedtime  insulin lispro (ADMELOG) corrective regimen sliding scale   SubCutaneous three times a day before meals  insulin lispro (ADMELOG) corrective regimen sliding scale   SubCutaneous at bedtime  lidocaine   4% Patch 1 Patch Transdermal daily  losartan 50 milliGRAM(s) Oral daily  NIFEdipine XL 30 milliGRAM(s) Oral daily  pantoprazole    Tablet 40 milliGRAM(s) Oral before breakfast  pregabalin 75 milliGRAM(s) Oral two times a day    MEDICATIONS  (PRN):  acetaminophen     Tablet .. 650 milliGRAM(s) Oral every 6 hours PRN Mild Pain (1 - 3)  albuterol    90 MICROgram(s) HFA Inhaler 2 Puff(s) Inhalation every 6 hours PRN Shortness of Breath and/or Wheezing  dextrose Oral Gel 15 Gram(s) Oral once PRN Blood Glucose LESS THAN 70 milliGRAM(s)/deciliter  melatonin 3 milliGRAM(s) Oral at bedtime PRN Insomnia    Allergies  Augmentin (Other)    Intolerances      REVIEW OF SYSTEMS  General: Denies fever and chills 	  Ophthalmologic: Denies blurred vision   Cardiovascular:	Denies CP   Gastrointestinal:	Denies B, V   Genitourinary:	denies urinary and fecal incontinence   Musculoskeletal:	Left back pain   Neurological: Left leg weakness 	    ROS: Pertinent positives in HPI, all other ROS were reviewed and are negative.      Vital Signs Last 24 Hrs  T(C): 36.5 (14 Aug 2023 13:20), Max: 36.6 (13 Aug 2023 21:15)  T(F): 97.7 (14 Aug 2023 13:20), Max: 97.9 (13 Aug 2023 21:15)  HR: 87 (14 Aug 2023 13:20) (67 - 87)  BP: 105/56 (14 Aug 2023 13:20) (98/54 - 105/56)  BP(mean): --  RR: 18 (14 Aug 2023 13:20) (18 - 18)  SpO2: 99% (14 Aug 2023 13:20) (95% - 99%)    Parameters below as of 14 Aug 2023 13:20  Patient On (Oxygen Delivery Method): room air        GENERAL EXAM:  Constitutional: awake and alert. NAD  HEENT: PERRL EOMI  Neck: Supple  Musculoskeletal: no joint swelling/tenderness, no abnormal movements  Skin: no rashes    NEUROLOGICAL EXAM:  MS: AAOX3, fluent, attends b/l; recent and remote memory intact; normal attention, language and fund of knowledge.   CN: VFF, EOMI, PERRL,    V1-3 intact, no facial asymmetry, t/p midline, SCM/trap intact.  Motor: Strength: 5/5 4x.   Tone: normal. Bulk: normal.   DTR 1+ symm. in biceps/triceps/brachoradialis b/l. Patellar unable to illcit due to bofy habitus   ankle reflex +1 b/l.    Plantar flex b/l.   Sensation: intact to LT and propioception in the LLE   Coordination: FTN intact b/l.    Gait:  Romberg negative, pull test negative; walks with narrow base, pivots in 2 steps.      Labs:   cbc  Chem      [id number] Chol 128 LDL -- HDL 60 Trig 84    Radiology:  CT head   IMPRESSION:  Age-appropriate involutional change and microvascular   ischemic disease. No intracranial hemorrhage. No evidence of large vessel   occlusion.   MRN-[id number]  Patient is a 68y old  Female who presents with a chief complaint of chest pain, r/o angina (14 Aug 2023 14:25)    HPI:  Patient is a 68 year old  Rt handed F with pmh  of HTN, DM2 (on oral glycemic, previously on insulin), CVA (left sided 3 years ago, with some residual left sided weakness), HF? (recently diagnosed in [facility]), asthma presented to [facility] for worsening  with left sided waist pain (described as a stretching/burning sensation) that radiates to the back that started 2 weeks. She reports the pain is worsened with exertion and worsens when bending or with movement. She reports over the last month she has had worsening SOB on exertion, that improves after 2 minutes at rest, that has become more frequent and occurs anytime she walks/exerts herself and is associated with syncope. She reports associated LE edema over the last month that is associated with PND. She reports weight gain 60-80 pounds in 2 years, more of which occurred over the last year.  Neurology consulted for LLE weakness and frequent falls. Patient mentions she has LBP which is located on the left side with pain around to her hip and not down her left leg. Patient states when this occurs she looses control on her left leg. Due to this, she has been having falls. Patient does not have headache strike nor LOC during these events.     PAST MEDICAL & SURGICAL HISTORY:  HTN (hypertension)      Diabetes      Asthma      CVA (cerebrovascular accident)      Status post hip surgery        FAMILY HISTORY:  Family history of stroke (Grandparent)    Family history of hypertension (Father)    FH: diabetes mellitus (Father)      Social Hx:  Nonsmoker, no drug or alcohol use    Home Medications:  albuterol CFC free 90 mcg/inh inhalation aerosol: 1 puff(s) inhaled every 4 hours, As Needed for shortness of breath or wheezing (11 Aug 2023 23:08)  amLODIPine 10 mg oral tablet: 1 tab(s) orally once a day (11 Aug 2023 23:05)  Lipitor 20 mg oral tablet: 1 tab(s) orally once a day (11 Aug 2023 23:07)  losartan 50 mg oral tablet: 1 tab(s) orally once a day (11 Aug 2023 23:08)  metFORMIN 500 mg oral tablet: 1 tab(s) orally 2 times a day (11 Aug 2023 23:05)  NIFEdipine (Eqv-Adalat CC) 30 mg oral tablet, extended release: 1 tab(s) orally once a day (11 Aug 2023 23:07)    MEDICATIONS  (STANDING):  atorvastatin 20 milliGRAM(s) Oral at bedtime  budesonide  80 MICROgram(s)/formoterol 4.5 MICROgram(s) Inhaler 2 Puff(s) Inhalation two times a day  dextrose 5%. 1000 milliLiter(s) (100 mL/Hr) IV Continuous <Continuous>  dextrose 5%. 1000 milliLiter(s) (50 mL/Hr) IV Continuous <Continuous>  dextrose 50% Injectable 25 Gram(s) IV Push once  dextrose 50% Injectable 12.5 Gram(s) IV Push once  dextrose 50% Injectable 25 Gram(s) IV Push once  glucagon  Injectable 1 milliGRAM(s) IntraMuscular once  heparin   Injectable 5000 Unit(s) SubCutaneous every 8 hours  insulin glargine Injectable (LANTUS) 3 Unit(s) SubCutaneous at bedtime  insulin lispro (ADMELOG) corrective regimen sliding scale   SubCutaneous three times a day before meals  insulin lispro (ADMELOG) corrective regimen sliding scale   SubCutaneous at bedtime  lidocaine   4% Patch 1 Patch Transdermal daily  losartan 50 milliGRAM(s) Oral daily  NIFEdipine XL 30 milliGRAM(s) Oral daily  pantoprazole    Tablet 40 milliGRAM(s) Oral before breakfast  pregabalin 75 milliGRAM(s) Oral two times a day    MEDICATIONS  (PRN):  acetaminophen     Tablet .. 650 milliGRAM(s) Oral every 6 hours PRN Mild Pain (1 - 3)  albuterol    90 MICROgram(s) HFA Inhaler 2 Puff(s) Inhalation every 6 hours PRN Shortness of Breath and/or Wheezing  dextrose Oral Gel 15 Gram(s) Oral once PRN Blood Glucose LESS THAN 70 milliGRAM(s)/deciliter  melatonin 3 milliGRAM(s) Oral at bedtime PRN Insomnia    Allergies  Augmentin (Other)    Intolerances      REVIEW OF SYSTEMS  General: Denies fever and chills 	  Ophthalmologic: Denies blurred vision   Cardiovascular:	Denies CP   Gastrointestinal:	Denies B, V   Genitourinary:	denies urinary and fecal incontinence   Musculoskeletal:	Left back pain   Neurological: Left leg weakness 	    ROS: Pertinent positives in HPI, all other ROS were reviewed and are negative.      Vital Signs Last 24 Hrs  T(C): 36.5 (14 Aug 2023 13:20), Max: 36.6 (13 Aug 2023 21:15)  T(F): 97.7 (14 Aug 2023 13:20), Max: 97.9 (13 Aug 2023 21:15)  HR: 87 (14 Aug 2023 13:20) (67 - 87)  BP: 105/56 (14 Aug 2023 13:20) (98/54 - 105/56)  BP(mean): --  RR: 18 (14 Aug 2023 13:20) (18 - 18)  SpO2: 99% (14 Aug 2023 13:20) (95% - 99%)    Parameters below as of 14 Aug 2023 13:20  Patient On (Oxygen Delivery Method): room air        GENERAL EXAM:  Constitutional: awake and alert. NAD  HEENT: PERRL EOMI  Neck: Supple  Musculoskeletal: no joint swelling/tenderness, no abnormal movements  Skin: no rashes    NEUROLOGICAL EXAM:  MS: AAOX3, fluent, attends b/l; recent and remote memory intact; normal attention, language and fund of knowledge.   CN: VFF, EOMI, PERRL,    V1-3 intact, no facial asymmetry, t/p midline, SCM/trap intact.  Motor: Strength: 5/5 4x.   Tone: normal. Bulk: normal.   DTR 1+ symm. in biceps/triceps/brachoradialis b/l. Patellar unable to illcit due to bofy habitus   ankle reflex +1 b/l.    Plantar flex b/l.   Sensation: intact to LT and propioception in the LLE   Coordination: FTN intact b/l.    Gait:  Romberg negative, pull test negative; walks with narrow base, pivots in 2 steps.      Labs:   cbc  Chem      [id number] Chol 128 LDL -- HDL 60 Trig 84    Radiology:  CT head   IMPRESSION:  Age-appropriate involutional change and microvascular   ischemic disease. No intracranial hemorrhage. No evidence of large vessel   occlusion.

## 2023-08-14 NOTE — CONSULT NOTE ADULT - ASSESSMENT
Patient is a 68 year old  Rt handed F with pmh  of HTN, DM2 (on oral glycemic, previously on insulin), CVA (left sided 3 years ago, with some residual left sided weakness), HF? (recently diagnosed in [facility]), asthma presented to [facility] for worsening  with left sided waist pain (described as a stretching/burning sensation) that radiates to the back that started 2 weeks. Neurology consulted for LLE weakness and frequent falls. Patient mentions she has LBP which is located on the left side with shooting pain down her left leg.         Impression:   Left sided left leg weakness with decreased sensation on LT proprioception with associated lumbar back pain with pain radiating down the left leg likely due to lumbar radiculopathy   Recommendations:   MR Lumbar spine   Continue on home medications   Strict diabetic control   CT head neg for an acute abnormalities  correction of electrolytes   PT/OT   EMG/NCS can be done as an outpatient     Case to be seen and discussed with Neurology Attending Dr. Mei. 
EKG - NSR Q waves v1 - v2     a/p     1) Chest pains - atypical , will get 2d echo and CT chest , on abx for pneumonia     2) s/p fall - h/o stroke, left leg gives out no LOC conisder neurology consult     3) ?h/o CHF - get 2d echo

## 2023-08-14 NOTE — CONSULT NOTE ADULT - ATTENDING COMMENTS
She describes pain in the left lower back around to the groin which is burning and has been there for several months but now worsening.  It is similar to the pain that she had when she had shingles 2 years ago.  Many years of numbness in both feet.  H/O DM.  H/O stroke with left sided weakness that improved quickly.     Motor exam with poor effort - 5/5 throughout with give-way weakness and no focal weakness in the left leg.   Reflexes - 2 throughout except for absent ankle reflexes.  Toes mute.   Scar from previous zoster in the left T12-L1 dermatomes - this is where she is c/o pain now.  Dysesthesias to touch.    Tenderness to palpation in multiple areas - left paraspinal muscles and spinous processes at multiple leves from T12 to sacral; over the left hip.     My review of MRI LS spine - mild-mod central canal stenosis at L4-5 - does not correlate with her symptoms.     < from: MR Lumbar Spine w/wo IV Cont (08.14.23 @ 19:43) >    LOWER THORACIC SPINE: No spinal canal or neuroforaminal stenosis.    L1-L2: No spinal canal or neuroforaminal stenosis.  L2-L3: Disc bulge.Bilateral facet hypertrophy. Mild spinal canal   stenosis. Mild bilateral neuroforaminal stenosis.  L3-L4: Left-sided disc bulge. Bilateral facet hypertrophy. Mild to   moderate spinal canal stenosis. Mild right and moderate left   neuroforaminal stenosis.  L4-L5: Disc bulge. Bilateral facet hypertrophy. Moderate spinal canal   stenosis. Moderate bilateral foraminal stenosis.  L5-S1: No spinal canal or neuroforaminal stenosis.      IMPRESSION:  Multiple T1 and T2 hyperintense nonenhancing lesionsscattered in the   thoracic and lumbar vertebral bodies, consistent with focal fatty   deposition versus hemangiomata  Multilevel degenerative changes as described above      < end of copied text >        A/P  Ms. Chavis is a 69 yo woman with severe neuropathic pain in region of previous zoster - 2 years ago.  It is unusual but possible that this is post herpetic neuralgia.  We will treat her neuropathic pain. Consider evaluation for local musculoskeletal disease also.   Increase pregabalin from 75 mg to 150 mg BID.  Lidoderm patch.  Further management as outpatient with neurology - [phone].  D/W patient and family.   Neurology signing off. Please reconsult PRN or call [phone] with any questions.  Thank you

## 2023-08-14 NOTE — PROGRESS NOTE ADULT - PROBLEM SELECTOR PLAN 6
-On metformin 500 mg BID currently, previously on insulin   -A1C 8.5  -correctional insulin   -start lantus 3u  -BG ACHS

## 2023-08-15 ENCOUNTER — TRANSCRIPTION ENCOUNTER (OUTPATIENT)
Age: 68
End: 2023-08-15

## 2023-08-15 LAB
ALBUMIN SERPL ELPH-MCNC: 3.8 G/DL — SIGNIFICANT CHANGE UP (ref 3.3–5)
ALP SERPL-CCNC: 66 U/L — SIGNIFICANT CHANGE UP (ref 40–120)
ALT FLD-CCNC: 26 U/L — SIGNIFICANT CHANGE UP (ref 4–33)
ANION GAP SERPL CALC-SCNC: 13 MMOL/L — SIGNIFICANT CHANGE UP (ref 7–14)
AST SERPL-CCNC: 29 U/L — SIGNIFICANT CHANGE UP (ref 4–32)
BILIRUB SERPL-MCNC: 0.3 MG/DL — SIGNIFICANT CHANGE UP (ref 0.2–1.2)
BUN SERPL-MCNC: 28 MG/DL — HIGH (ref 7–23)
CALCIUM SERPL-MCNC: 9.8 MG/DL — SIGNIFICANT CHANGE UP (ref 8.4–10.5)
CHLORIDE SERPL-SCNC: 100 MMOL/L — SIGNIFICANT CHANGE UP (ref 98–107)
CO2 SERPL-SCNC: 22 MMOL/L — SIGNIFICANT CHANGE UP (ref 22–31)
CREAT ?TM UR-MCNC: 182 MG/DL — SIGNIFICANT CHANGE UP
CREAT SERPL-MCNC: 2.1 MG/DL — HIGH (ref 0.5–1.3)
EGFR: 26 ML/MIN/1.73M2 — LOW
GLUCOSE BLDC GLUCOMTR-MCNC: 168 MG/DL — HIGH (ref 70–99)
GLUCOSE BLDC GLUCOMTR-MCNC: 255 MG/DL — HIGH (ref 70–99)
GLUCOSE BLDC GLUCOMTR-MCNC: 340 MG/DL — HIGH (ref 70–99)
GLUCOSE BLDC GLUCOMTR-MCNC: 363 MG/DL — HIGH (ref 70–99)
GLUCOSE SERPL-MCNC: 160 MG/DL — HIGH (ref 70–99)
HCT VFR BLD CALC: 33.8 % — LOW (ref 34.5–45)
HGB BLD-MCNC: 10.4 G/DL — LOW (ref 11.5–15.5)
MAGNESIUM SERPL-MCNC: 2.2 MG/DL — SIGNIFICANT CHANGE UP (ref 1.6–2.6)
MCHC RBC-ENTMCNC: 23.5 PG — LOW (ref 27–34)
MCHC RBC-ENTMCNC: 30.8 GM/DL — LOW (ref 32–36)
MCV RBC AUTO: 76.5 FL — LOW (ref 80–100)
NRBC # BLD: 0 /100 WBCS — SIGNIFICANT CHANGE UP (ref 0–0)
NRBC # FLD: 0 K/UL — SIGNIFICANT CHANGE UP (ref 0–0)
PLATELET # BLD AUTO: 279 K/UL — SIGNIFICANT CHANGE UP (ref 150–400)
POTASSIUM SERPL-MCNC: 5.3 MMOL/L — SIGNIFICANT CHANGE UP (ref 3.5–5.3)
POTASSIUM SERPL-SCNC: 5.3 MMOL/L — SIGNIFICANT CHANGE UP (ref 3.5–5.3)
PROT SERPL-MCNC: 7.3 G/DL — SIGNIFICANT CHANGE UP (ref 6–8.3)
RBC # BLD: 4.42 M/UL — SIGNIFICANT CHANGE UP (ref 3.8–5.2)
RBC # FLD: 15.4 % — HIGH (ref 10.3–14.5)
SODIUM SERPL-SCNC: 135 MMOL/L — SIGNIFICANT CHANGE UP (ref 135–145)
SODIUM UR-SCNC: <20 MMOL/L — SIGNIFICANT CHANGE UP
WBC # BLD: 5.78 K/UL — SIGNIFICANT CHANGE UP (ref 3.8–10.5)
WBC # FLD AUTO: 5.78 K/UL — SIGNIFICANT CHANGE UP (ref 3.8–10.5)

## 2023-08-15 PROCEDURE — 99233 SBSQ HOSP IP/OBS HIGH 50: CPT

## 2023-08-15 PROCEDURE — 99254 IP/OBS CNSLTJ NEW/EST MOD 60: CPT

## 2023-08-15 RX ORDER — SODIUM CHLORIDE 9 MG/ML
1000 INJECTION INTRAMUSCULAR; INTRAVENOUS; SUBCUTANEOUS
Refills: 0 | Status: DISCONTINUED | OUTPATIENT
Start: 2023-08-15 | End: 2023-08-16

## 2023-08-15 RX ADMIN — BUDESONIDE AND FORMOTEROL FUMARATE DIHYDRATE 2 PUFF(S): 160; 4.5 AEROSOL RESPIRATORY (INHALATION) at 08:39

## 2023-08-15 RX ADMIN — HEPARIN SODIUM 5000 UNIT(S): 5000 INJECTION INTRAVENOUS; SUBCUTANEOUS at 22:27

## 2023-08-15 RX ADMIN — ATORVASTATIN CALCIUM 20 MILLIGRAM(S): 80 TABLET, FILM COATED ORAL at 22:28

## 2023-08-15 RX ADMIN — Medication 3: at 17:32

## 2023-08-15 RX ADMIN — Medication 30 MILLIGRAM(S): at 06:24

## 2023-08-15 RX ADMIN — Medication 75 MILLIGRAM(S): at 06:24

## 2023-08-15 RX ADMIN — LIDOCAINE 1 PATCH: 4 CREAM TOPICAL at 19:13

## 2023-08-15 RX ADMIN — LOSARTAN POTASSIUM 50 MILLIGRAM(S): 100 TABLET, FILM COATED ORAL at 06:24

## 2023-08-15 RX ADMIN — Medication 4: at 12:27

## 2023-08-15 RX ADMIN — INSULIN GLARGINE 3 UNIT(S): 100 INJECTION, SOLUTION SUBCUTANEOUS at 22:27

## 2023-08-15 RX ADMIN — SODIUM CHLORIDE 100 MILLILITER(S): 9 INJECTION INTRAMUSCULAR; INTRAVENOUS; SUBCUTANEOUS at 11:56

## 2023-08-15 RX ADMIN — HEPARIN SODIUM 5000 UNIT(S): 5000 INJECTION INTRAVENOUS; SUBCUTANEOUS at 13:17

## 2023-08-15 RX ADMIN — Medication 75 MILLIGRAM(S): at 17:06

## 2023-08-15 RX ADMIN — HEPARIN SODIUM 5000 UNIT(S): 5000 INJECTION INTRAVENOUS; SUBCUTANEOUS at 06:27

## 2023-08-15 RX ADMIN — Medication 3: at 22:26

## 2023-08-15 RX ADMIN — PANTOPRAZOLE SODIUM 40 MILLIGRAM(S): 20 TABLET, DELAYED RELEASE ORAL at 06:24

## 2023-08-15 RX ADMIN — LIDOCAINE 1 PATCH: 4 CREAM TOPICAL at 13:14

## 2023-08-15 RX ADMIN — Medication 1: at 08:39

## 2023-08-15 RX ADMIN — BUDESONIDE AND FORMOTEROL FUMARATE DIHYDRATE 2 PUFF(S): 160; 4.5 AEROSOL RESPIRATORY (INHALATION) at 22:25

## 2023-08-15 NOTE — PROGRESS NOTE ADULT - PROBLEM SELECTOR PLAN 10
seizures- she thinks they are panic or anxiety attacks\"    Stress bladder incontinence, female      Past Surgical History:   Procedure Laterality Date    BREAST SURGERY  10/2015    left breast bx    CARDIAC CATHETERIZATION      per old chart pt had cath done in 3/2011 and 9/2013    CHOLECYSTECTOMY  per old chart done [facility]  3/11/13    diverticulosis, cecal polyp    COLONOSCOPY  03/16/2017    Internal hemorrhoids    ENDOSCOPY, COLON, DIAGNOSTIC  03/16/2017    EGD: Small esophageal varices, portal hypertensive gastropathy, reflux esophagitis, hiatal hernia    EYE SURGERY Bilateral ? when    cyst removal, cataracts    HYSTERECTOMY      per old chart pt had CHOLO/BSO 1986    TONSILLECTOMY  as a kid     Family History   Problem Relation Age of Onset    Cancer Mother         lung ca    Arthritis Mother     Migraines Mother     Cancer Father         colon ca    Diabetes Father     High Blood Pressure Father     Arthritis Father     High Cholesterol Father     Migraines Father     Migraines Sister     Heart Disease Brother         WPW     Social History     Social History    Marital status:      Spouse name: N/A    Number of children: N/A    Years of education: N/A     Occupational History    Not on file. Social History Main Topics    Smoking status: Current Every Day Smoker     Packs/day: 0.50     Years: 40.00     Types: Cigarettes    Smokeless tobacco: Never Used    Alcohol use No      Comment: very little/\"less than one time per month- use to drink alot - every day in the past\"    Drug use: Yes     Frequency: 3.0 times per week     Types: Marijuana    Sexual activity: Not Currently     Partners: Male     Other Topics Concern    Not on file     Social History Narrative    No narrative on file       Allergies   Allergen Reactions    Norco [Hydrocodone-Acetaminophen] Itching     Itching, rash, nausea and vomiting.      Tylenol [Acetaminophen] Itching, Nausea And
-will do advair and albuterol PRN for breakthrough  -continue to monitor
DVT prophylaxis: heparin 5000 every 8 hours  GI prophylaxis: none- but sounds like she needs workup for GERD as an outpatient, since she has bad reflux for many years, needs endoscopy and colonoscopy (since never had colonoscopy).   Full code  Diet: diabetes diet
-will do advair and albuterol PRN for breakthrough  -continue to monitor
-will do advair and albuterol PRN for breakthrough  -continue to monitor

## 2023-08-15 NOTE — DISCHARGE NOTE PROVIDER - NSDCCPCAREPLAN_GEN_ALL_CORE_FT
PRINCIPAL DISCHARGE DIAGNOSIS  Diagnosis: Chest pain  Assessment and Plan of Treatment: You were admitted to the hospital due to pain around your left chest, waist and back. We performed testing to ensure that you did not have any heart or lung problems. Our CT scans did not show any issues with the lungs and we performed a sonogram of your heart which showed that it was normal. Our neurologists evaluated you and looked at the MRI of your spine which was also unremarkable for anything acute. They believe that some of your symptoms may be due to a previous episode of shingles that you may have had and they recommend increasing your dose of Lyrica. Please follow up with the neurologist, Dr. Chase, outpatient for further management. If you experience new chest pain, difficulty breathing, or dizziness, please return to the hospital.      SECONDARY DISCHARGE DIAGNOSES  Diagnosis: CATRACHITA (acute kidney injury)  Assessment and Plan of Treatment: In the hospital we noted that your kidney function was slightly decreased compared to before. We performed tests that showed that the kidney function was likely lowered due to dehydration. You were given fluids via IV and this improved your kidney function. Continue to stay well hydrated. One of your medications, Losartan, was stopped in the hospital as it can make your kidney function worse. Please wait until 8/18 before restarting your losartan to give your kidneys time to fully recover. Be sure to follow up with your primary care doctor for follow up testing to ensure that your kidney function has fully recovered.

## 2023-08-15 NOTE — DISCHARGE NOTE PROVIDER - NSDCMRMEDTOKEN_GEN_ALL_CORE_FT
albuterol CFC free 90 mcg/inh inhalation aerosol: 1 puff(s) inhaled every 4 hours, As Needed for shortness of breath or wheezing  amLODIPine 10 mg oral tablet: 1 tab(s) orally once a day  Lipitor 20 mg oral tablet: 1 tab(s) orally once a day  losartan 50 mg oral tablet: 1 tab(s) orally once a day  metFORMIN 500 mg oral tablet: 1 tab(s) orally 2 times a day  NIFEdipine (Eqv-Adalat CC) 30 mg oral tablet, extended release: 1 tab(s) orally once a day  Rolling Walker X 1: Rolling Walker X1   albuterol CFC free 90 mcg/inh inhalation aerosol: 1 puff(s) inhaled every 4 hours, As Needed for shortness of breath or wheezing  Lipitor 20 mg oral tablet: 1 tab(s) orally once a day  metFORMIN 500 mg oral tablet: 1 tab(s) orally 2 times a day  NIFEdipine (Eqv-Adalat CC) 30 mg oral tablet, extended release: 1 tab(s) orally once a day  pantoprazole 40 mg oral delayed release tablet: 1 tab(s) orally once a day (before a meal)  pregabalin 150 mg oral capsule: 1 cap(s) orally every 12 hours MDD: 300mg   albuterol CFC free 90 mcg/inh inhalation aerosol: 1 puff(s) inhaled every 4 hours, As Needed for shortness of breath or wheezing  amLODIPine 10 mg oral tablet: 1 orally  Lipitor 20 mg oral tablet: 1 tab(s) orally once a day  metFORMIN 500 mg oral tablet: 1 tab(s) orally 2 times a day  NIFEdipine (Eqv-Adalat CC) 30 mg oral tablet, extended release: 1 tab(s) orally once a day  pantoprazole 40 mg oral delayed release tablet: 1 tab(s) orally once a day (before a meal)  pregabalin 150 mg oral capsule: 1 cap(s) orally every 12 hours MDD: 300mg   albuterol CFC free 90 mcg/inh inhalation aerosol: 1 puff(s) inhaled every 4 hours, As Needed for shortness of breath or wheezing  Glucometer: test fingerstick sugars three times a day before meals  Lipitor 20 mg oral tablet: 1 tab(s) orally once a day  metFORMIN 500 mg oral tablet: 1 tab(s) orally 2 times a day  NIFEdipine (Eqv-Adalat CC) 30 mg oral tablet, extended release: 1 tab(s) orally once a day  pantoprazole 40 mg oral delayed release tablet: 1 tab(s) orally once a day (before a meal)  pregabalin 150 mg oral capsule: 1 cap(s) orally every 12 hours MDD: 300mg  Test Strips: Test three times a day before meals   albuterol CFC free 90 mcg/inh inhalation aerosol: 1 puff(s) inhaled every 4 hours, As Needed for shortness of breath or wheezing  Glucometer: test fingerstick sugars three times a day before meals  Lancets: Use with glucometer  three times a day  Lipitor 20 mg oral tablet: 1 tab(s) orally once a day  metFORMIN 500 mg oral tablet: 1 tab(s) orally 2 times a day  NIFEdipine (Eqv-Adalat CC) 30 mg oral tablet, extended release: 1 tab(s) orally once a day  pantoprazole 40 mg oral delayed release tablet: 1 tab(s) orally once a day (before a meal)  pregabalin 150 mg oral capsule: 1 cap(s) orally every 12 hours MDD: 300mg  Test Strips: Test three times a day before meals

## 2023-08-15 NOTE — PROGRESS NOTE ADULT - PROBLEM SELECTOR PLAN 2
-intermittent chest pain on exertion with associated shortness of breath AND syncope concerning for angina   -trop 35->29,   -EKG with Q waves in V1-V3 concerning   -TTE ordered   -telemetry  -Card consulted  -Incidental finding of partially calcified left breast nodules on CT chest, recommend mammographic evaluation outpt
-BUN/Cr 37/1.26, unknown baseline, suspect hypovolemia is part of it given patient reports she does not drink any water   -obtain baseline Cr when able   -Renal bladder US ordered to rule out nephrolithiasis vs hydronephrosis   -Urine, Na ordered   -strict intake and output every 4 hours  -continue to monitor
-intermittent chest pain on exertion with associated shortness of breath AND syncope concerning for angina   -trop 35->29,   -EKG with Q waves in V1-V3 concerning   -TTE with mild diastolic dysfunction, EF 60-65%  -telemetry monitoring  -Cards following, recs appreciated  -Incidental finding of partially calcified left breast nodules on CT chest, recommend mammographic evaluation outpt
-intermittent chest pain on exertion with associated shortness of breath AND syncope concerning for angina   -trop 35->29,   -EKG with Q waves in V1-V3 concerning   -TTE with mild diastolic dysfunction, EF 60-65%  -telemetry monitoring  -Cards following, recs appreciated  -Incidental finding of partially calcified left breast nodules on CT chest, recommend mammographic evaluation outpt

## 2023-08-15 NOTE — PROGRESS NOTE ADULT - PROBLEM SELECTOR PLAN 9
- continue Lipitor 20 mg nightly
-will do advair and albuterol PRN for breakthrough  -continue to monitor

## 2023-08-15 NOTE — PROGRESS NOTE ADULT - PROBLEM SELECTOR PLAN 11
DVT prophylaxis: heparin 5000 every 8 hours  Full code  Diet: diabetes diet  Dispo: home with outpatient PT when Creatinine is downtrending, possible 8/16
DVT prophylaxis: heparin 5000 every 8 hours  Full code  Diet: diabetes diet
DVT prophylaxis: heparin 5000 every 8 hours  Full code  Diet: diabetes diet

## 2023-08-15 NOTE — PROGRESS NOTE ADULT - PROBLEM SELECTOR PLAN 4
-reportedly with productive cough over last week, was on ceftin at home   -will need to get accurate accounts of how many days of antibiotics she has taken   -will do ceftriaxone and azithromycin for now, will recommend completion of 7 days.   -CXR clear, will order CT chest since ordered CT abdomen and pelvis, to r/o PNA  -continue to monitor
-Hb 10/HCT 31.4, MCV 75  -iron studies ordered  -continue to monitor

## 2023-08-15 NOTE — PROGRESS NOTE ADULT - PROBLEM SELECTOR PLAN 8
- at home on amlodipine 10 mg daily, nifedipine 15 mg BID and losartan 50 mg daily   - will need to clarify home meds  -continue to monitor
- at home on amlodipine 10 mg daily, nifedipine 15 mg BID and losartan 50 mg daily   - will need to clarify home meds  -continue to monitor
- continue Lipitor 20 mg nightly   -f/u lipid profile
- at home on amlodipine 10 mg daily, nifedipine 15 mg BID and losartan 50 mg daily   - will need to clarify home meds  -continue to monitor

## 2023-08-15 NOTE — PROGRESS NOTE ADULT - PROBLEM SELECTOR PLAN 1
-intermittent chest pain on exertion with associated shortness of breath AND syncope concerning for angina   -trop 35->29,   -EKG with Q waves in V1-V3 concerning   -TTE ordered   -telemetry  -Card consulted
On CT: Nonspecific lucent lesions in the L2 and L3 vertebral body. C/o back pain across the L side of abdomen  -MR performed showing likely fatty infiltration  -Neuro eval noted, can increase pregabalin dose, however will wait until Cr improves  -PT rec outpatient PT  -pain control with tylenol, lidocaine patch, lyrica  - CTH without any acute findings  - neuro eval noted, recs appreciated
On CT: Nonspecific lucent lesions in the L2 and L3 vertebral body. C/o back pain across the L side of abdomen  -MR to further eval  -PT eval  -pain control with tylenol, lidocaine patch, lyrica
On CT: Nonspecific lucent lesions in the L2 and L3 vertebral body. C/o back pain across the L side of abdomen  -MR to further eval, pending read  -PT eval  -pain control with tylenol, lidocaine patch, lyrica  - CTH without any acute findings  - neuro eval noted, recs appreciated

## 2023-08-15 NOTE — DISCHARGE NOTE PROVIDER - HOSPITAL COURSE
Patient is a 68 year old F with history of HTN, DM2 (on oral glycemic, previously on insulin), CVA (left sided 3 years ago, with some residual left sided weakness), HF? (recently diagnosed in [facility]), and asthma who presents with left sided waist pain that radiates to the back with exertion for the last 2 weeks. Patient admitted for further workup. CTH performed without any acute findings. Patient had CT Chest performed which showed suspicious lesions in the L2/L3 spine. Patient underwent further testing with MRI of the spine which revealed that the lesions were more likely fatty infiltration. Patient also evaluated by neurology team and suspected that the patient's waist pain may be due to previous case of herpes zoster. Patient recommended for increased dose of lyrica and outpatient neurology follow up. Patient also had initial infectious workup performed with positive UA but negative urine cultures and no symptoms. Patient initially started on empiric treatment for CAP but CT chest did not reveal any infectious etiology and patient did not have any further symptoms. She also underwent cardiology eval and TTE did not show any WMA, only mild diastolic dysfunction. Patient is otherwise medically optimized for discharge home with outpatient PCP and neurology follow up.

## 2023-08-15 NOTE — DISCHARGE NOTE PROVIDER - ATTENDING DISCHARGE PHYSICAL EXAMINATION:
Patient seen and examined at bedside on day of discharge (8/16/23). Patient s/p mech fall this AM. States she had some unsteadiness and slowly lowered herself to the ground. Patient denies any discomfort or trauma. Denies any LOC or headstrike. Patient otherwise feeling well and eager to go home. Continues to endorse mild pain in her waist. Denies any CP, fever, nausea, vomiting, SOB or cough. VSS. Exam unremarkable. Labs notable for markedly improved Cr with PO and IV hydration. Patient is otherwise medically stable for discharge home with PCP, neurology follow up.

## 2023-08-15 NOTE — DISCHARGE NOTE PROVIDER - CARE PROVIDER_API CALL
Daysi Chase  Neurology  [address] [place] [address]  Phone: [phone]  Fax: [phone]  Follow Up Time: 1 month

## 2023-08-15 NOTE — PROGRESS NOTE ADULT - PROBLEM SELECTOR PLAN 5
-reportedly with productive cough over last week, was on ceftin at home for 2 days  -CT chest negative for pna  -procal 0.1  -hold off on further abx
-reportedly with productive cough over last week, was on ceftin at home for 2 days  -CT chest negative for pna  -procal 0.1  -hold off on further abx
-On metformin 500 mg BID currently, previously on insulin   -A1C in AM  -correctional insulin for now  -BG ACHS
-reportedly with productive cough over last week, was on ceftin at home for 2 days  -CT chest negative for pna  -procal 0.1  -hold off on further abx

## 2023-08-15 NOTE — PROGRESS NOTE ADULT - PROBLEM SELECTOR PROBLEM 3
CATRACHITA (acute kidney injury)
Microcytic anemia

## 2023-08-15 NOTE — PROGRESS NOTE ADULT - PROBLEM SELECTOR PLAN 3
-BUN/Cr 37/1.26, unknown baseline, suspect hypovolemia is part of it given patient reports she does not drink any water   -obtain baseline Cr when able   -Renal bladder US ordered to rule out nephrolithiasis vs hydronephrosis   -Urine, Na ordered   -strict intake and output every 4 hours  -continue to monitor
-Hb 10/HCT 31.4, MCV 75  -iron studies ordered  -continue to monitor
-BUN/Cr 37/1.26, unknown baseline, suspect hypovolemia is part of it given patient reports she does not drink any water  -obtain baseline Cr when able   -Renal bladder US ordered to rule out nephrolithiasis vs hydronephrosis   -Urine, Na ordered   -strict intake and output every 4 hours  -continue to monitor
-BUN/Cr 37/1.26, unknown baseline, suspect hypovolemia is part of it given patient reports she does not drink any water  -Cr now increased to 2.0  -obtain baseline Cr when able   -Renal bladder US WNL  -Urine lytes with FENa of 0.2% suggesting pre-renal etiology  -IV hydration with NS  -strict intake and output every 4 hours  -continue to monitor

## 2023-08-15 NOTE — PROGRESS NOTE ADULT - PROBLEM SELECTOR PROBLEM 6
DM2 (diabetes mellitus, type 2)
Lactic acidosis
DM2 (diabetes mellitus, type 2)
DM2 (diabetes mellitus, type 2)

## 2023-08-16 ENCOUNTER — TRANSCRIPTION ENCOUNTER (OUTPATIENT)
Age: 68
End: 2023-08-16

## 2023-08-16 VITALS
OXYGEN SATURATION: 98 % | TEMPERATURE: 98 F | HEART RATE: 98 BPM | SYSTOLIC BLOOD PRESSURE: 128 MMHG | DIASTOLIC BLOOD PRESSURE: 65 MMHG | RESPIRATION RATE: 18 BRPM

## 2023-08-16 LAB
ANION GAP SERPL CALC-SCNC: 12 MMOL/L — SIGNIFICANT CHANGE UP (ref 7–14)
BUN SERPL-MCNC: 26 MG/DL — HIGH (ref 7–23)
CALCIUM SERPL-MCNC: 9 MG/DL — SIGNIFICANT CHANGE UP (ref 8.4–10.5)
CHLORIDE SERPL-SCNC: 99 MMOL/L — SIGNIFICANT CHANGE UP (ref 98–107)
CO2 SERPL-SCNC: 21 MMOL/L — LOW (ref 22–31)
CREAT SERPL-MCNC: 1.49 MG/DL — HIGH (ref 0.5–1.3)
EGFR: 38 ML/MIN/1.73M2 — LOW
GLUCOSE BLDC GLUCOMTR-MCNC: 165 MG/DL — HIGH (ref 70–99)
GLUCOSE BLDC GLUCOMTR-MCNC: 182 MG/DL — HIGH (ref 70–99)
GLUCOSE BLDC GLUCOMTR-MCNC: 321 MG/DL — HIGH (ref 70–99)
GLUCOSE SERPL-MCNC: 169 MG/DL — HIGH (ref 70–99)
HCT VFR BLD CALC: 30.5 % — LOW (ref 34.5–45)
HGB BLD-MCNC: 9.5 G/DL — LOW (ref 11.5–15.5)
MAGNESIUM SERPL-MCNC: 2.1 MG/DL — SIGNIFICANT CHANGE UP (ref 1.6–2.6)
MCHC RBC-ENTMCNC: 24.2 PG — LOW (ref 27–34)
MCHC RBC-ENTMCNC: 31.1 GM/DL — LOW (ref 32–36)
MCV RBC AUTO: 77.8 FL — LOW (ref 80–100)
NRBC # BLD: 0 /100 WBCS — SIGNIFICANT CHANGE UP (ref 0–0)
NRBC # FLD: 0 K/UL — SIGNIFICANT CHANGE UP (ref 0–0)
PHOSPHATE SERPL-MCNC: 4 MG/DL — SIGNIFICANT CHANGE UP (ref 2.5–4.5)
PLATELET # BLD AUTO: 239 K/UL — SIGNIFICANT CHANGE UP (ref 150–400)
POTASSIUM SERPL-MCNC: 5.1 MMOL/L — SIGNIFICANT CHANGE UP (ref 3.5–5.3)
POTASSIUM SERPL-SCNC: 5.1 MMOL/L — SIGNIFICANT CHANGE UP (ref 3.5–5.3)
RBC # BLD: 3.92 M/UL — SIGNIFICANT CHANGE UP (ref 3.8–5.2)
RBC # FLD: 15.1 % — HIGH (ref 10.3–14.5)
SODIUM SERPL-SCNC: 132 MMOL/L — LOW (ref 135–145)
WBC # BLD: 5.41 K/UL — SIGNIFICANT CHANGE UP (ref 3.8–10.5)
WBC # FLD AUTO: 5.41 K/UL — SIGNIFICANT CHANGE UP (ref 3.8–10.5)

## 2023-08-16 PROCEDURE — 99239 HOSP IP/OBS DSCHRG MGMT >30: CPT

## 2023-08-16 RX ORDER — AMLODIPINE BESYLATE 2.5 MG/1
1 TABLET ORAL
Refills: 0 | DISCHARGE

## 2023-08-16 RX ORDER — LOSARTAN POTASSIUM 100 MG/1
1 TABLET, FILM COATED ORAL
Refills: 0 | DISCHARGE

## 2023-08-16 RX ORDER — AMLODIPINE BESYLATE 2.5 MG/1
1 TABLET ORAL
Qty: 0 | Refills: 0 | DISCHARGE

## 2023-08-16 RX ORDER — PANTOPRAZOLE SODIUM 20 MG/1
1 TABLET, DELAYED RELEASE ORAL
Qty: 30 | Refills: 0
Start: 2023-08-16 | End: 2023-09-14

## 2023-08-16 RX ADMIN — HEPARIN SODIUM 5000 UNIT(S): 5000 INJECTION INTRAVENOUS; SUBCUTANEOUS at 06:45

## 2023-08-16 RX ADMIN — Medication 1: at 09:20

## 2023-08-16 RX ADMIN — Medication 75 MILLIGRAM(S): at 06:45

## 2023-08-16 RX ADMIN — Medication 4: at 13:02

## 2023-08-16 RX ADMIN — PANTOPRAZOLE SODIUM 40 MILLIGRAM(S): 20 TABLET, DELAYED RELEASE ORAL at 06:45

## 2023-08-16 RX ADMIN — Medication 30 MILLIGRAM(S): at 06:45

## 2023-08-16 RX ADMIN — BUDESONIDE AND FORMOTEROL FUMARATE DIHYDRATE 2 PUFF(S): 160; 4.5 AEROSOL RESPIRATORY (INHALATION) at 09:21

## 2023-08-16 NOTE — PROVIDER CONTACT NOTE (FALL NOTIFICATION) - SITUATION
Pt AXOX4, ambulated to the bathroom with walker and RN assist. After pt finished using bathroom RN found pt on bathroom floor, pt stated did not hit head, legs felt weak and fell on her side.
Mark Hernandez

## 2023-08-16 NOTE — PROVIDER CONTACT NOTE (FALL NOTIFICATION) - ASSESSMENT
Final Anesthesia Post-op Assessment    Patient: Jaimee Freeman  Procedure(s) Performed: LEFT HIP ARTHROSCOPY, RIM TRIMMING, LABRAL REPAIR, FEMORAL OSTEOPLASTY - LEFT  Anesthesia type: Spinal    Vitals Value Taken Time   Temp 36.1 °C (97 °F) 7/25/2019  9:50 AM   Pulse 48 7/25/2019  9:52 AM   Resp 20 7/25/2019  9:52 AM   /61 7/25/2019  9:50 AM   SpO2 100 % 7/25/2019  9:52 AM   Vitals shown include unvalidated device data.    Last 24 I/O:     Intake/Output Summary (Last 24 hours) at 7/26/2019 0853  Last data filed at 7/25/2019 1530  Gross per 24 hour   Intake 1160 ml   Output 1975 ml   Net -815 ml       PATIENT LOCATION: Phase II  POST-OP VITAL SIGNS: stable  LEVEL OF CONSCIOUSNESS: participates in exam, answers questions appropriately, awake, alert and oriented  RESPIRATORY STATUS: spontaneous ventilation and unassisted  CARDIOVASCULAR: blood pressure returned to baseline  HYDRATION: euvolemic    PAIN MANAGEMENT: adequately controlled  NAUSEA: None  AIRWAY PATENCY: patent  POST-OP ASSESSMENT: no complications, patient tolerated procedure well with no complications, no evidence of recall and sufficiently recovered from acute administration of anesthesia effects and able to participate in evaluation  COMPLICATIONS: none      
Vital signs stable, no injuries, blood glucose 165.

## 2023-08-16 NOTE — PROGRESS NOTE ADULT - REASON FOR ADMISSION
chest pain, r/o angina

## 2023-08-16 NOTE — PROGRESS NOTE ADULT - SUBJECTIVE AND OBJECTIVE BOX
PROGRESS NOTE:     Patient is a 68y old  Female who presents with a chief complaint of chest pain, r/o angina (12 Aug 2023 02:35)      SUBJECTIVE / OVERNIGHT EVENTS: no acute event overnight. Report chest pain subsided. Reports burning pain on the L side of the stomach radiating from the back. Reports having shingle in the area two years ago.    ADDITIONAL REVIEW OF SYSTEMS:    MEDICATIONS  (STANDING):  atorvastatin 20 milliGRAM(s) Oral at bedtime  azithromycin   Tablet 500 milliGRAM(s) Oral daily  budesonide  80 MICROgram(s)/formoterol 4.5 MICROgram(s) Inhaler 2 Puff(s) Inhalation two times a day  cefTRIAXone   IVPB 1000 milliGRAM(s) IV Intermittent every 24 hours  dextrose 5%. 1000 milliLiter(s) (50 mL/Hr) IV Continuous <Continuous>  dextrose 5%. 1000 milliLiter(s) (100 mL/Hr) IV Continuous <Continuous>  dextrose 50% Injectable 25 Gram(s) IV Push once  dextrose 50% Injectable 12.5 Gram(s) IV Push once  dextrose 50% Injectable 25 Gram(s) IV Push once  glucagon  Injectable 1 milliGRAM(s) IntraMuscular once  heparin   Injectable 5000 Unit(s) SubCutaneous every 8 hours  insulin lispro (ADMELOG) corrective regimen sliding scale   SubCutaneous three times a day before meals  insulin lispro (ADMELOG) corrective regimen sliding scale   SubCutaneous at bedtime  losartan 50 milliGRAM(s) Oral daily  NIFEdipine XL 30 milliGRAM(s) Oral daily    MEDICATIONS  (PRN):  acetaminophen     Tablet .. 650 milliGRAM(s) Oral every 6 hours PRN Mild Pain (1 - 3)  albuterol    90 MICROgram(s) HFA Inhaler 2 Puff(s) Inhalation every 6 hours PRN Shortness of Breath and/or Wheezing  dextrose Oral Gel 15 Gram(s) Oral once PRN Blood Glucose LESS THAN 70 milliGRAM(s)/deciliter  melatonin 3 milliGRAM(s) Oral at bedtime PRN Insomnia      CAPILLARY BLOOD GLUCOSE      POCT Blood Glucose.: 169 mg/dL (12 Aug 2023 21:30)  POCT Blood Glucose.: 144 mg/dL (12 Aug 2023 17:23)  POCT Blood Glucose.: 255 mg/dL (12 Aug 2023 12:01)    I&O's Summary      PHYSICAL EXAM:  Vital Signs Last 24 Hrs  T(C): 36.8 (12 Aug 2023 21:13), Max: 37.1 (12 Aug 2023 05:55)  T(F): 98.3 (12 Aug 2023 21:13), Max: 98.7 (12 Aug 2023 05:55)  HR: 112 (12 Aug 2023 21:13) (92 - 112)  BP: 109/59 (12 Aug 2023 21:13) (109/59 - 141/83)  BP(mean): --  RR: 18 (12 Aug 2023 21:13) (18 - 18)  SpO2: 100% (12 Aug 2023 21:13) (100% - 100%)    Parameters below as of 12 Aug 2023 14:10  Patient On (Oxygen Delivery Method): room air        CONSTITUTIONAL: NAD, laying in bed comfortably  RESPIRATORY: Normal respiratory effort; lungs are clear to auscultation bilaterally  CARDIOVASCULAR: Regular rate and rhythm, normal S1 and S2, no murmur/rub/gallop  ABDOMEN: Nontender to palpation, normoactive bowel sounds, no rebound/guarding  MUSCLOSKELETAL: no clubbing or cyanosis of digits; tender to palpation on the L flank region extending laterally to the L side of the abdomen  SKIN: no rash, erythema, lesion  PSYCH: A+O to person, place, and time; affect appropriate    LABS:                        10.2   5.69  )-----------( 220      ( 12 Aug 2023 05:35 )             32.5     08-12    139  |  103  |  26<H>  ----------------------------<  118<H>  4.4   |  24  |  1.12    Ca    9.9      12 Aug 2023 05:35  Phos  3.8     08-12  Mg     1.70     08-12    TPro  6.9  /  Alb  3.5  /  TBili  0.4  /  DBili  x   /  AST  34<H>  /  ALT  22  /  AlkPhos  62  08-12    PT/INR - ( 12 Aug 2023 05:35 )   PT: 11.7 sec;   INR: 1.05 ratio         PTT - ( 12 Aug 2023 05:35 )  PTT:29.8 sec      Urinalysis Basic - ( 12 Aug 2023 05:35 )    Color: x / Appearance: x / SG: x / pH: x  Gluc: 118 mg/dL / Ketone: x  / Bili: x / Urobili: x   Blood: x / Protein: x / Nitrite: x   Leuk Esterase: x / RBC: x / WBC x   Sq Epi: x / Non Sq Epi: x / Bacteria: x          RADIOLOGY & ADDITIONAL TESTS:  Results Reviewed:   Imaging Personally Reviewed:  Electrocardiogram Personally Reviewed:    COORDINATION OF CARE:  Care Discussed with Consultants/Other Providers [Y/N]:  Prior or Outpatient Records Reviewed [Y/N]:  
PROGRESS NOTE:     Patient is a 68y old  Female who presents with a chief complaint of chest pain, r/o angina (13 Aug 2023 11:41)      SUBJECTIVE / OVERNIGHT EVENTS: no acute event overnight. Reports continuing to have back pain going across the L side of the abdomen. No n/v/d. No chest pain, sob.    ADDITIONAL REVIEW OF SYSTEMS:    MEDICATIONS  (STANDING):  atorvastatin 20 milliGRAM(s) Oral at bedtime  azithromycin   Tablet 500 milliGRAM(s) Oral daily  budesonide  80 MICROgram(s)/formoterol 4.5 MICROgram(s) Inhaler 2 Puff(s) Inhalation two times a day  cefTRIAXone   IVPB 1000 milliGRAM(s) IV Intermittent every 24 hours  dextrose 5%. 1000 milliLiter(s) (100 mL/Hr) IV Continuous <Continuous>  dextrose 5%. 1000 milliLiter(s) (50 mL/Hr) IV Continuous <Continuous>  dextrose 50% Injectable 25 Gram(s) IV Push once  dextrose 50% Injectable 25 Gram(s) IV Push once  dextrose 50% Injectable 12.5 Gram(s) IV Push once  glucagon  Injectable 1 milliGRAM(s) IntraMuscular once  heparin   Injectable 5000 Unit(s) SubCutaneous every 8 hours  insulin lispro (ADMELOG) corrective regimen sliding scale   SubCutaneous at bedtime  insulin lispro (ADMELOG) corrective regimen sliding scale   SubCutaneous three times a day before meals  losartan 50 milliGRAM(s) Oral daily  NIFEdipine XL 30 milliGRAM(s) Oral daily  pregabalin 25 milliGRAM(s) Oral two times a day    MEDICATIONS  (PRN):  acetaminophen     Tablet .. 650 milliGRAM(s) Oral every 6 hours PRN Mild Pain (1 - 3)  albuterol    90 MICROgram(s) HFA Inhaler 2 Puff(s) Inhalation every 6 hours PRN Shortness of Breath and/or Wheezing  dextrose Oral Gel 15 Gram(s) Oral once PRN Blood Glucose LESS THAN 70 milliGRAM(s)/deciliter  melatonin 3 milliGRAM(s) Oral at bedtime PRN Insomnia      CAPILLARY BLOOD GLUCOSE      POCT Blood Glucose.: 242 mg/dL (13 Aug 2023 12:55)  POCT Blood Glucose.: 276 mg/dL (13 Aug 2023 09:34)  POCT Blood Glucose.: 169 mg/dL (12 Aug 2023 21:30)  POCT Blood Glucose.: 144 mg/dL (12 Aug 2023 17:23)    I&O's Summary    13 Aug 2023 07:01  -  13 Aug 2023 16:41  --------------------------------------------------------  IN: 600 mL / OUT: 3 mL / NET: 597 mL        PHYSICAL EXAM:  Vital Signs Last 24 Hrs  T(C): 36.8 (13 Aug 2023 14:22), Max: 36.8 (12 Aug 2023 21:13)  T(F): 98.2 (13 Aug 2023 14:22), Max: 98.3 (12 Aug 2023 21:13)  HR: 114 (13 Aug 2023 14:22) (67 - 114)  BP: 127/62 (13 Aug 2023 14:22) (109/59 - 143/81)  BP(mean): --  RR: 18 (13 Aug 2023 14:22) (16 - 18)  SpO2: 100% (13 Aug 2023 14:22) (100% - 100%)    Parameters below as of 13 Aug 2023 14:22  Patient On (Oxygen Delivery Method): room air      CONSTITUTIONAL: NAD, laying in bed comfortably  RESPIRATORY: Normal respiratory effort; lungs are clear to auscultation bilaterally  CARDIOVASCULAR: Regular rate and rhythm, normal S1 and S2, no murmur/rub/gallop  ABDOMEN: Nontender to palpation, normoactive bowel sounds, no rebound/guarding  MUSCLOSKELETAL: no clubbing or cyanosis of digits; tender to palpation on the L flank region extending laterally to the L side of the abdomen  SKIN: no rash, erythema, lesion  PSYCH: A+O to person, place, and time; affect appropriate    LABS:                        10.2   5.69  )-----------( 220      ( 12 Aug 2023 05:35 )             32.5     08-12    139  |  103  |  26<H>  ----------------------------<  118<H>  4.4   |  24  |  1.12    Ca    9.9      12 Aug 2023 05:35  Phos  3.8     08-12  Mg     1.70     08-12    TPro  6.9  /  Alb  3.5  /  TBili  0.4  /  DBili  x   /  AST  34<H>  /  ALT  22  /  AlkPhos  62  08-12    PT/INR - ( 12 Aug 2023 05:35 )   PT: 11.7 sec;   INR: 1.05 ratio         PTT - ( 12 Aug 2023 05:35 )  PTT:29.8 sec      Urinalysis Basic - ( 12 Aug 2023 05:35 )    Color: x / Appearance: x / SG: x / pH: x  Gluc: 118 mg/dL / Ketone: x  / Bili: x / Urobili: x   Blood: x / Protein: x / Nitrite: x   Leuk Esterase: x / RBC: x / WBC x   Sq Epi: x / Non Sq Epi: x / Bacteria: x        Culture - Blood (collected 12 Aug 2023 01:00)  Source: .Blood Blood-Peripheral  Preliminary Report (13 Aug 2023 07:01):    No growth at 24 hours    Culture - Blood (collected 12 Aug 2023 00:30)  Source: .Blood Blood-Peripheral  Preliminary Report (13 Aug 2023 08:01):    No growth at 24 hours    Culture - Urine (collected 11 Aug 2023 12:19)  Source: Clean Catch Clean Catch (Midstream)  Final Report (13 Aug 2023 11:26):    >=3 organisms. Probable collection contamination.        RADIOLOGY & ADDITIONAL TESTS:  Results Reviewed:   Imaging Personally Reviewed:  Electrocardiogram Personally Reviewed:    COORDINATION OF CARE:  Care Discussed with Consultants/Other Providers [Y/N]:  Prior or Outpatient Records Reviewed [Y/N]:  
  Víctor Fuentes MD  Interventional Cardiology / Endovascular Specialist  [place] Office : [address] [place] [phone] Tel:   [place] Office : [address] [facility] [address] [address]  Tel: [phone]      Subjective/Overnight events: Patient lying in bed. No acute distress.   	  MEDICATIONS:  heparin   Injectable 5000 Unit(s) SubCutaneous every 8 hours  NIFEdipine XL 30 milliGRAM(s) Oral daily      albuterol    90 MICROgram(s) HFA Inhaler 2 Puff(s) Inhalation every 6 hours PRN  budesonide  80 MICROgram(s)/formoterol 4.5 MICROgram(s) Inhaler 2 Puff(s) Inhalation two times a day    acetaminophen     Tablet .. 650 milliGRAM(s) Oral every 6 hours PRN  melatonin 3 milliGRAM(s) Oral at bedtime PRN  pregabalin 75 milliGRAM(s) Oral two times a day    pantoprazole    Tablet 40 milliGRAM(s) Oral before breakfast    atorvastatin 20 milliGRAM(s) Oral at bedtime  dextrose 50% Injectable 25 Gram(s) IV Push once  dextrose 50% Injectable 12.5 Gram(s) IV Push once  dextrose 50% Injectable 25 Gram(s) IV Push once  dextrose Oral Gel 15 Gram(s) Oral once PRN  glucagon  Injectable 1 milliGRAM(s) IntraMuscular once  insulin glargine Injectable (LANTUS) 3 Unit(s) SubCutaneous at bedtime  insulin lispro (ADMELOG) corrective regimen sliding scale   SubCutaneous three times a day before meals  insulin lispro (ADMELOG) corrective regimen sliding scale   SubCutaneous at bedtime    dextrose 5%. 1000 milliLiter(s) IV Continuous <Continuous>  dextrose 5%. 1000 milliLiter(s) IV Continuous <Continuous>  lidocaine   4% Patch 1 Patch Transdermal daily  sodium chloride 0.9%. 1000 milliLiter(s) IV Continuous <Continuous>      PAST MEDICAL/SURGICAL HISTORY  PAST MEDICAL & SURGICAL HISTORY:  HTN (hypertension)      Diabetes      Asthma      CVA (cerebrovascular accident)      Status post hip surgery          SOCIAL HISTORY: Substance Use (street drugs): ( x ) never used  (  ) other:    FAMILY HISTORY:  Family history of stroke (Grandparent)    Family history of hypertension (Father)    FH: diabetes mellitus (Father)            PHYSICAL EXAM:  T(C): 36.5 (08-15-23 @ 12:35), Max: 36.8 (08-15-23 @ 09:30)  HR: 95 (08-15-23 @ 12:35) (68 - 95)  BP: 117/63 (08-15-23 @ 12:35) (97/53 - 124/77)  RR: 18 (08-15-23 @ 12:35) (18 - 18)  SpO2: 100% (08-15-23 @ 12:35) (99% - 100%)  Wt(kg): --  I&O's Summary    Height (cm): 152.4 (08-15 @ 13:38)    GENERAL: NAD  EYES: EOMI, PERRLA, conjunctiva and sclera clear  ENMT: No tonsillar erythema, exudates, or enlargement  Cardiovascular: Normal S1 S2, No JVD, No murmurs, No edema  Respiratory: Lungs clear to auscultation	  Gastrointestinal:  Soft, Non-tender, + BS	  Extremities: No edema                                     10.4   5.78  )-----------( 279      ( 15 Aug 2023 07:52 )             33.8     08-15    135  |  100  |  28<H>  ----------------------------<  160<H>  5.3   |  22  |  2.10<H>    Ca    9.8      15 Aug 2023 07:52  Mg     2.20     08-15    TPro  7.3  /  Alb  3.8  /  TBili  0.3  /  DBili  x   /  AST  29  /  ALT  26  /  AlkPhos  66  08-15    proBNP:   Lipid Profile:   HgA1c:   TSH:     Consultant(s) Notes Reviewed:  [x ] YES  [ ] NO    Care Discussed with Consultants/Other Providers [ x] YES  [ ] NO    Imaging Personally Reviewed independently:  [x] YES  [ ] NO    All labs, radiologic studies, vitals, orders and medications list reviewed. Patient is seen and examined at bedside. Case discussed with medical team.              
  Víctor Fuentes MD  Interventional Cardiology / Endovascular Specialist  [place] Office : [address] [place] [address] Tel:   [place] Office : [address] [facility] [address] [address]  Tel: [phone]      Subjective/Overnight events: Patient lying in bed comfortably. No acute distress. Denies chest pain, SOB or palpitations  	  MEDICATIONS:  heparin   Injectable 5000 Unit(s) SubCutaneous every 8 hours  NIFEdipine XL 30 milliGRAM(s) Oral daily      albuterol    90 MICROgram(s) HFA Inhaler 2 Puff(s) Inhalation every 6 hours PRN  budesonide  80 MICROgram(s)/formoterol 4.5 MICROgram(s) Inhaler 2 Puff(s) Inhalation two times a day    acetaminophen     Tablet .. 650 milliGRAM(s) Oral every 6 hours PRN  melatonin 3 milliGRAM(s) Oral at bedtime PRN  pregabalin 150 milliGRAM(s) Oral every 12 hours    pantoprazole    Tablet 40 milliGRAM(s) Oral before breakfast    atorvastatin 20 milliGRAM(s) Oral at bedtime  dextrose 50% Injectable 25 Gram(s) IV Push once  dextrose 50% Injectable 12.5 Gram(s) IV Push once  dextrose 50% Injectable 25 Gram(s) IV Push once  dextrose Oral Gel 15 Gram(s) Oral once PRN  glucagon  Injectable 1 milliGRAM(s) IntraMuscular once  insulin glargine Injectable (LANTUS) 3 Unit(s) SubCutaneous at bedtime  insulin lispro (ADMELOG) corrective regimen sliding scale   SubCutaneous three times a day before meals  insulin lispro (ADMELOG) corrective regimen sliding scale   SubCutaneous at bedtime    dextrose 5%. 1000 milliLiter(s) IV Continuous <Continuous>  dextrose 5%. 1000 milliLiter(s) IV Continuous <Continuous>  lidocaine   4% Patch 1 Patch Transdermal daily  sodium chloride 0.9%. 1000 milliLiter(s) IV Continuous <Continuous>      PAST MEDICAL/SURGICAL HISTORY  PAST MEDICAL & SURGICAL HISTORY:  HTN (hypertension)      Diabetes      Asthma      CVA (cerebrovascular accident)      Status post hip surgery          SOCIAL HISTORY: Substance Use (street drugs): ( x ) never used  (  ) other:    FAMILY HISTORY:  Family history of stroke (Grandparent)    Family history of hypertension (Father)    FH: diabetes mellitus (Father)        PHYSICAL EXAM:  T(C): 36.5 (08-16-23 @ 10:05), Max: 36.8 (08-15-23 @ 21:00)  HR: 98 (08-16-23 @ 10:05) (86 - 109)  BP: 128/65 (08-16-23 @ 10:05) (111/56 - 137/83)  RR: 18 (08-16-23 @ 10:05) (16 - 18)  SpO2: 98% (08-16-23 @ 10:05) (98% - 100%)  Wt(kg): --  I&O's Summary        GENERAL: NAD  EYES: EOMI, PERRLA, conjunctiva and sclera clear  ENMT: No tonsillar erythema, exudates, or enlargement  Cardiovascular: Normal S1 S2, No JVD, No murmurs, No edema  Respiratory: Lungs clear to auscultation	  Gastrointestinal:  Soft, Non-tender, + BS	  Extremities: No edema                                     9.5    5.41  )-----------( 239      ( 16 Aug 2023 07:54 )             30.5     08-16    132<L>  |  99  |  26<H>  ----------------------------<  169<H>  5.1   |  21<L>  |  1.49<H>    Ca    9.0      16 Aug 2023 07:54  Phos  4.0     08-16  Mg     2.10     08-16    TPro  7.3  /  Alb  3.8  /  TBili  0.3  /  DBili  x   /  AST  29  /  ALT  26  /  AlkPhos  66  08-15    proBNP:   Lipid Profile:   HgA1c:   TSH:     Consultant(s) Notes Reviewed:  [x ] YES  [ ] NO    Care Discussed with Consultants/Other Providers [ x] YES  [ ] NO    Imaging Personally Reviewed independently:  [x] YES  [ ] NO    All labs, radiologic studies, vitals, orders and medications list reviewed. Patient is seen and examined at bedside. Case discussed with medical team.              
  Víctor Fuentes MD  Interventional Cardiology / Endovascular Specialist  [place] Office : [address] [place] [address] Tel:   [place] Office : [address] [facility] [address] [address]  Tel: [phone]      Subjective/Overnight events: Patient lying in bed. No acute distress.   	  MEDICATIONS:  heparin   Injectable 5000 Unit(s) SubCutaneous every 8 hours  losartan 50 milliGRAM(s) Oral daily  NIFEdipine XL 30 milliGRAM(s) Oral daily      albuterol    90 MICROgram(s) HFA Inhaler 2 Puff(s) Inhalation every 6 hours PRN  budesonide  80 MICROgram(s)/formoterol 4.5 MICROgram(s) Inhaler 2 Puff(s) Inhalation two times a day    acetaminophen     Tablet .. 650 milliGRAM(s) Oral every 6 hours PRN  melatonin 3 milliGRAM(s) Oral at bedtime PRN  pregabalin 75 milliGRAM(s) Oral two times a day    pantoprazole    Tablet 40 milliGRAM(s) Oral before breakfast    atorvastatin 20 milliGRAM(s) Oral at bedtime  dextrose 50% Injectable 25 Gram(s) IV Push once  dextrose 50% Injectable 12.5 Gram(s) IV Push once  dextrose 50% Injectable 25 Gram(s) IV Push once  dextrose Oral Gel 15 Gram(s) Oral once PRN  glucagon  Injectable 1 milliGRAM(s) IntraMuscular once  insulin glargine Injectable (LANTUS) 3 Unit(s) SubCutaneous at bedtime  insulin lispro (ADMELOG) corrective regimen sliding scale   SubCutaneous three times a day before meals  insulin lispro (ADMELOG) corrective regimen sliding scale   SubCutaneous at bedtime    dextrose 5%. 1000 milliLiter(s) IV Continuous <Continuous>  dextrose 5%. 1000 milliLiter(s) IV Continuous <Continuous>  lidocaine   4% Patch 1 Patch Transdermal daily      PAST MEDICAL/SURGICAL HISTORY  PAST MEDICAL & SURGICAL HISTORY:  HTN (hypertension)      Diabetes      Asthma      CVA (cerebrovascular accident)      Status post hip surgery          SOCIAL HISTORY: Substance Use (street drugs): ( x ) never used  (  ) other:    FAMILY HISTORY:  Family history of stroke (Grandparent)    Family history of hypertension (Father)    FH: diabetes mellitus (Father)            PHYSICAL EXAM:  T(C): 36.5 (08-14-23 @ 13:20), Max: 36.6 (08-13-23 @ 21:15)  HR: 87 (08-14-23 @ 13:20) (67 - 87)  BP: 105/56 (08-14-23 @ 13:20) (98/54 - 105/56)  RR: 18 (08-14-23 @ 13:20) (18 - 18)  SpO2: 99% (08-14-23 @ 13:20) (95% - 99%)  Wt(kg): --  I&O's Summary    13 Aug 2023 07:01  -  14 Aug 2023 07:00  --------------------------------------------------------  IN: 1000 mL / OUT: 2 mL / NET: 998 mL          GENERAL: NAD  EYES: EOMI, PERRLA, conjunctiva and sclera clear  ENMT: No tonsillar erythema, exudates, or enlargement  Cardiovascular: Normal S1 S2, No JVD, No murmurs, No edema  Respiratory: Lungs clear to auscultation	  Gastrointestinal:  Soft, Non-tender, + BS	  Extremities: No edema                       proBNP:   Lipid Profile:   HgA1c:   TSH:     Consultant(s) Notes Reviewed:  [x ] YES  [ ] NO    Care Discussed with Consultants/Other Providers [ x] YES  [ ] NO    Imaging Personally Reviewed independently:  [x] YES  [ ] NO    All labs, radiologic studies, vitals, orders and medications list reviewed. Patient is seen and examined at bedside. Case discussed with medical team.              
[facility] Department of Hospital Medicine  Meg De La Fuente MD  Available on MS Teams  Pager: [phone]    Patient is a 68y old  Female who presents with a chief complaint of chest pain, r/o angina (14 Aug 2023 15:28)    OVERNIGHT EVENTS: No acute events overnight.    SUBJECTIVE: Pt seen and examined at bedside this morning. Reports that she feels well. Continues to endorse left sided back pain that radiates down her hip. Continues to endorse left sided LE weakness.    ADDITIONAL REVIEW OF SYSTEMS: as above.    MEDICATIONS  (STANDING):  atorvastatin 20 milliGRAM(s) Oral at bedtime  budesonide  80 MICROgram(s)/formoterol 4.5 MICROgram(s) Inhaler 2 Puff(s) Inhalation two times a day  dextrose 5%. 1000 milliLiter(s) (100 mL/Hr) IV Continuous <Continuous>  dextrose 5%. 1000 milliLiter(s) (50 mL/Hr) IV Continuous <Continuous>  dextrose 50% Injectable 25 Gram(s) IV Push once  dextrose 50% Injectable 12.5 Gram(s) IV Push once  dextrose 50% Injectable 25 Gram(s) IV Push once  glucagon  Injectable 1 milliGRAM(s) IntraMuscular once  heparin   Injectable 5000 Unit(s) SubCutaneous every 8 hours  insulin glargine Injectable (LANTUS) 3 Unit(s) SubCutaneous at bedtime  insulin lispro (ADMELOG) corrective regimen sliding scale   SubCutaneous at bedtime  insulin lispro (ADMELOG) corrective regimen sliding scale   SubCutaneous three times a day before meals  lidocaine   4% Patch 1 Patch Transdermal daily  losartan 50 milliGRAM(s) Oral daily  NIFEdipine XL 30 milliGRAM(s) Oral daily  pantoprazole    Tablet 40 milliGRAM(s) Oral before breakfast  pregabalin 75 milliGRAM(s) Oral two times a day    MEDICATIONS  (PRN):  acetaminophen     Tablet .. 650 milliGRAM(s) Oral every 6 hours PRN Mild Pain (1 - 3)  albuterol    90 MICROgram(s) HFA Inhaler 2 Puff(s) Inhalation every 6 hours PRN Shortness of Breath and/or Wheezing  dextrose Oral Gel 15 Gram(s) Oral once PRN Blood Glucose LESS THAN 70 milliGRAM(s)/deciliter  melatonin 3 milliGRAM(s) Oral at bedtime PRN Insomnia    CAPILLARY BLOOD GLUCOSE    POCT Blood Glucose.: 256 mg/dL (14 Aug 2023 21:38)  POCT Blood Glucose.: 230 mg/dL (14 Aug 2023 17:23)  POCT Blood Glucose.: 224 mg/dL (14 Aug 2023 12:32)  POCT Blood Glucose.: 190 mg/dL (14 Aug 2023 09:23)    I&O's Summary    13 Aug 2023 07:01  -  14 Aug 2023 07:00  --------------------------------------------------------  IN: 1000 mL / OUT: 2 mL / NET: 998 mL    PHYSICAL EXAM:  Vital Signs Last 24 Hrs  T(C): 36.7 (14 Aug 2023 17:30), Max: 36.7 (14 Aug 2023 17:30)  T(F): 98 (14 Aug 2023 17:30), Max: 98 (14 Aug 2023 17:30)  HR: 95 (14 Aug 2023 17:30) (67 - 95)  BP: 124/77 (14 Aug 2023 17:30) (99/53 - 124/77)  BP(mean): --  RR: 18 (14 Aug 2023 17:30) (18 - 18)  SpO2: 99% (14 Aug 2023 17:30) (99% - 99%)    Parameters below as of 14 Aug 2023 17:30  Patient On (Oxygen Delivery Method): room air    CONSTITUTIONAL: NAD, well-developed  HEAD: Normocephalic, atraumatic  EYES: EOMI, PERRL  ENT: no rhinorrhea, no pharyngeal erythema  RESPIRATORY: No increased work of breathing, CTAB, no wheezes or crackles appreciated  CARDIOVASCULAR: RRR, S1 and S2 present, no m/r/g  ABDOMEN: soft, NT, ND, bowel sounds present  EXTREMITIES: No LE edema  MUSCULOSKELETAL: no joint swelling, no tenderness to palpation  NEURO: A&Ox3, moving all extremities, LLE 4/5 strength,    LABS:    Culture - Urine (collected 13 Aug 2023 06:30)  Source: Clean Catch Clean Catch (Midstream)  Final Report (14 Aug 2023 14:32):    <10,000 CFU/mL Normal Urogenital Fidelia    Culture - Blood (collected 12 Aug 2023 01:00)  Source: .Blood Blood-Peripheral  Preliminary Report (14 Aug 2023 07:01):    No growth at 48 Hours    Culture - Blood (collected 12 Aug 2023 00:30)  Source: .Blood Blood-Peripheral  Preliminary Report (14 Aug 2023 08:01):    No growth at 48 Hours    RADIOLOGY & ADDITIONAL TESTS:    Results Reviewed:   Imaging Personally Reviewed:  Electrocardiogram Personally Reviewed:    COORDINATION OF CARE:  Care Discussed with Consultants/Other Providers [Y/N]:  Prior or Outpatient Records Reviewed [Y/N]:  
[facility] Department of Hospital Medicine  Meg De La Fuente MD  Available on MS Teams  Pager: [phone]    Patient is a 68y old  Female who presents with a chief complaint of chest pain, r/o angina (15 Aug 2023 16:39)    OVERNIGHT EVENTS: No acute events overnight.    SUBJECTIVE: Pt seen and examined at bedside this morning. Patient's son present at bedside. Patient reports that she feels well. Denies any acute complaints. Reports some improvement in her back pain. States she has been drinking very little water and urinating very little as well. Denies any CP, SOB or fever.    ADDITIONAL REVIEW OF SYSTEMS: as above.    MEDICATIONS  (STANDING):  atorvastatin 20 milliGRAM(s) Oral at bedtime  budesonide  80 MICROgram(s)/formoterol 4.5 MICROgram(s) Inhaler 2 Puff(s) Inhalation two times a day  dextrose 5%. 1000 milliLiter(s) (50 mL/Hr) IV Continuous <Continuous>  dextrose 5%. 1000 milliLiter(s) (100 mL/Hr) IV Continuous <Continuous>  dextrose 50% Injectable 25 Gram(s) IV Push once  dextrose 50% Injectable 12.5 Gram(s) IV Push once  dextrose 50% Injectable 25 Gram(s) IV Push once  glucagon  Injectable 1 milliGRAM(s) IntraMuscular once  heparin   Injectable 5000 Unit(s) SubCutaneous every 8 hours  insulin glargine Injectable (LANTUS) 3 Unit(s) SubCutaneous at bedtime  insulin lispro (ADMELOG) corrective regimen sliding scale   SubCutaneous three times a day before meals  insulin lispro (ADMELOG) corrective regimen sliding scale   SubCutaneous at bedtime  lidocaine   4% Patch 1 Patch Transdermal daily  NIFEdipine XL 30 milliGRAM(s) Oral daily  pantoprazole    Tablet 40 milliGRAM(s) Oral before breakfast  pregabalin 75 milliGRAM(s) Oral two times a day  sodium chloride 0.9%. 1000 milliLiter(s) (100 mL/Hr) IV Continuous <Continuous>    MEDICATIONS  (PRN):  acetaminophen     Tablet .. 650 milliGRAM(s) Oral every 6 hours PRN Mild Pain (1 - 3)  albuterol    90 MICROgram(s) HFA Inhaler 2 Puff(s) Inhalation every 6 hours PRN Shortness of Breath and/or Wheezing  dextrose Oral Gel 15 Gram(s) Oral once PRN Blood Glucose LESS THAN 70 milliGRAM(s)/deciliter  melatonin 3 milliGRAM(s) Oral at bedtime PRN Insomnia    CAPILLARY BLOOD GLUCOSE    POCT Blood Glucose.: 255 mg/dL (15 Aug 2023 17:29)  POCT Blood Glucose.: 340 mg/dL (15 Aug 2023 12:24)  POCT Blood Glucose.: 168 mg/dL (15 Aug 2023 08:31)  POCT Blood Glucose.: 256 mg/dL (14 Aug 2023 21:38)    I&O's Summary    PHYSICAL EXAM:  Vital Signs Last 24 Hrs  T(C): 36.5 (15 Aug 2023 12:35), Max: 36.8 (15 Aug 2023 09:30)  T(F): 97.7 (15 Aug 2023 12:35), Max: 98.3 (15 Aug 2023 09:30)  HR: 95 (15 Aug 2023 12:35) (68 - 95)  BP: 117/63 (15 Aug 2023 12:35) (97/53 - 117/63)  BP(mean): --  RR: 18 (15 Aug 2023 12:35) (18 - 18)  SpO2: 100% (15 Aug 2023 12:35) (99% - 100%)    Parameters below as of 15 Aug 2023 12:35  Patient On (Oxygen Delivery Method): room air    CONSTITUTIONAL: NAD, well-developed  HEAD: Normocephalic, atraumatic  EYES: EOMI, PERRL  ENT: no rhinorrhea, no pharyngeal erythema  RESPIRATORY: No increased work of breathing, CTAB, no wheezes or crackles appreciated  CARDIOVASCULAR: RRR, S1 and S2 present, no m/r/g  ABDOMEN: soft, NT, ND, bowel sounds present  EXTREMITIES: No LE edema  MUSCULOSKELETAL: no joint swelling, no tenderness to palpation  NEURO: A&Ox3, moving all extremities, LLE 4/5 strength    LABS:                        10.4   5.78  )-----------( 279      ( 15 Aug 2023 07:52 )             33.8     08-15    135  |  100  |  28<H>  ----------------------------<  160<H>  5.3   |  22  |  2.10<H>    Ca    9.8      15 Aug 2023 07:52  Mg     2.20     08-15    TPro  7.3  /  Alb  3.8  /  TBili  0.3  /  DBili  x   /  AST  29  /  ALT  26  /  AlkPhos  66  08-15    Urinalysis Basic - ( 15 Aug 2023 07:52 )    Color: x / Appearance: x / SG: x / pH: x  Gluc: 160 mg/dL / Ketone: x  / Bili: x / Urobili: x   Blood: x / Protein: x / Nitrite: x   Leuk Esterase: x / RBC: x / WBC x   Sq Epi: x / Non Sq Epi: x / Bacteria: x    Culture - Urine (collected 13 Aug 2023 06:30)  Source: Clean Catch Clean Catch (Midstream)  Final Report (14 Aug 2023 14:32):    <10,000 CFU/mL Normal Urogenital Fidelia    RADIOLOGY & ADDITIONAL TESTS:  < from: MR Lumbar Spine w/wo IV Cont (08.14.23 @ 19:43) >  IMPRESSION:  Multiple T1 and T2 hyperintense nonenhancing lesionsscattered in the   thoracic and lumbar vertebral bodies, consistent with focal fatty   deposition versus hemangiomata  Multilevel degenerative changes as described above    < end of copied text >      Results Reviewed:   Imaging Personally Reviewed:  Electrocardiogram Personally Reviewed:    COORDINATION OF CARE:  Care Discussed with Consultants/Other Providers [Y/N]:  Prior or Outpatient Records Reviewed [Y/N]:

## 2023-08-16 NOTE — DISCHARGE NOTE NURSING/CASE MANAGEMENT/SOCIAL WORK - PATIENT PORTAL LINK FT
You can access the FollowMyHealth Patient Portal offered by [facility] by registering at the following website: [email or web address]. By joining [facility]’s FollowMyHealth portal, you will also be able to view your health information using other applications (apps) compatible with our system.

## 2023-08-16 NOTE — PROGRESS NOTE ADULT - ASSESSMENT
68 year old F with history of HTN, DM2 (on oral glycemic, previously on insulin), CVA (left sided 3 years ago, with some residual left sided weakness), HF? (recently diagnosed in [facility]), asthma who presents with left sided waist pain (described as a stretching/burning sensation) that radiates to the back that started 2 weeks.     EKG - NSR Q waves v1 - v2       1) Chest pains  -atypical   -CT chest no PNA  -TTE grossly normal LV systolic function, mild diastolic dysfunction    2) s/p fall   - h/o stroke, left leg gives out no LOC f/u neurology consult     3) CATRACHITA  -creat upto 2.10 8/15 improving with IVF  -continue to hold losartan, BP remains controlled      
68 year old F with history of HTN, DM2 (on oral glycemic, previously on insulin), CVA (left sided 3 years ago, with some residual left sided weakness), HF? (recently diagnosed in [facility]), asthma who presents with left sided waist pain (described as a stretching/burning sensation) that radiates to the back that started 2 weeks.     EKG - NSR Q waves v1 - v2       1) Chest pains  -atypical   -CT chest no PNA  -TTE grossly normal LV systolic function, mild diastolic dysfunction    2) s/p fall   - h/o stroke, left leg gives out no LOC f/u neurology consult     3) CATRACHITA  -creat upto 2.10 today  -f/u urine studies  
68 year old F with history of HTN, DM2 (on oral glycemic, previously on insulin), CVA (left sided 3 years ago, with some residual left sided weakness), HF? (recently diagnosed in [facility]), asthma who presents with left sided waist pain (described as a stretching/burning sensation) that radiates to the back that started 2 weeks.     EKG - NSR Q waves v1 - v2       1) Chest pains  -atypical   -CT chest no PNA  -TTE pending    2) s/p fall   - h/o stroke, left leg gives out no LOC consider neurology consult     3) ?h/o CHF   - get 2d echo   
Mr. Christianson is a 68 year old F with history of HTN, DM2 (on oral glycemic, previously on insulin), CVA (left sided 3 years ago, with some residual left sided weakness), HF? (recently diagnosed in [facility]), and asthma who presents with left sided waist pain that radiates to the back with exertion for the last 2 weeks. 
Mr. Christianson is a 68 year old F with history of HTN, DM2 (on oral glycemic, previously on insulin), CVA (left sided 3 years ago, with some residual left sided weakness), HF? (recently diagnosed in [facility]), and asthma who presents with left sided waist pain that radiates to the back with exertion for the last 2 weeks. 
Mr. Christianson is a 68 year old F with history of HTN, DM2 (on oral glycemic, previously on insulin), CVA (left sided 3 years ago, with some residual left sided weakness), HF? (recently diagnosed in [facility]), and asthma who presents with left sided waist pain that radiates to the back with exertion for the last 2 weeks. 
Mr. Christianson is a 68 year old F with history of HTN, DM2 (on oral glycemic, previously on insulin), CVA (left sided 3 years ago, with some residual left sided weakness), HF? (recently diagnosed in [facility]), and asthma who presents with left sided waist pain that radiates to the back with exertion for the last 2 weeks.

## 2023-08-16 NOTE — CHART NOTE - NSCHARTNOTEFT_GEN_A_CORE
Pt seen earlier after a fall. Pt says she was getting up from the bathroom and she fell on her left side, attempting to brace herself with her hands. Pt says nurse got her back in bed where she is resting comfortably now. Pt denies hitting her head, dizziness, lightheadedness or any back,  L leg or shoulder pain. Pt says she didn't hit her side hard and she was only on the floor for a short time. Patient with full range of motion of L hip and leg with no bruising. Educated patient on walking with assistance and to use the call bed when she wants to get out of bed  given pt was admitted with LLE weakness and frequent falls. Pt placed on fall precautions.

## 2023-08-16 NOTE — PROVIDER CONTACT NOTE (FALL NOTIFICATION) - ACTION/TREATMENT ORDERED:
Implemented all safety protocol, bed alarm on, call bell within reach. pt educated regarding fall precaution.

## 2023-08-17 LAB
CULTURE RESULTS: SIGNIFICANT CHANGE UP
CULTURE RESULTS: SIGNIFICANT CHANGE UP
SPECIMEN SOURCE: SIGNIFICANT CHANGE UP
SPECIMEN SOURCE: SIGNIFICANT CHANGE UP